# Patient Record
Sex: FEMALE | Race: WHITE | Employment: UNEMPLOYED | URBAN - METROPOLITAN AREA
[De-identification: names, ages, dates, MRNs, and addresses within clinical notes are randomized per-mention and may not be internally consistent; named-entity substitution may affect disease eponyms.]

---

## 2018-05-04 ENCOUNTER — OFFICE VISIT (OUTPATIENT)
Dept: FAMILY MEDICINE CLINIC | Facility: CLINIC | Age: 38
End: 2018-05-04

## 2018-05-04 ENCOUNTER — LAB ENCOUNTER (OUTPATIENT)
Dept: LAB | Age: 38
End: 2018-05-04
Attending: FAMILY MEDICINE
Payer: MEDICAID

## 2018-05-04 VITALS
HEART RATE: 79 BPM | HEIGHT: 63 IN | SYSTOLIC BLOOD PRESSURE: 98 MMHG | OXYGEN SATURATION: 99 % | TEMPERATURE: 98 F | BODY MASS INDEX: 17.89 KG/M2 | WEIGHT: 101 LBS | DIASTOLIC BLOOD PRESSURE: 60 MMHG

## 2018-05-04 DIAGNOSIS — Z00.00 ROUTINE MEDICAL EXAM: Primary | ICD-10-CM

## 2018-05-04 DIAGNOSIS — E55.9 VITAMIN D DEFICIENCY: ICD-10-CM

## 2018-05-04 DIAGNOSIS — D89.89 AUTOIMMUNE DISORDER (HCC): ICD-10-CM

## 2018-05-04 DIAGNOSIS — E53.8 B12 DEFICIENCY: ICD-10-CM

## 2018-05-04 DIAGNOSIS — D50.8 OTHER IRON DEFICIENCY ANEMIA: ICD-10-CM

## 2018-05-04 DIAGNOSIS — G62.9 NEUROPATHY: ICD-10-CM

## 2018-05-04 DIAGNOSIS — M41.25 OTHER IDIOPATHIC SCOLIOSIS, THORACOLUMBAR REGION: ICD-10-CM

## 2018-05-04 DIAGNOSIS — Z00.00 ROUTINE MEDICAL EXAM: ICD-10-CM

## 2018-05-04 DIAGNOSIS — D72.818 OTHER DECREASED WHITE BLOOD CELL (WBC) COUNT: ICD-10-CM

## 2018-05-04 DIAGNOSIS — Z78.9 VEGAN: ICD-10-CM

## 2018-05-04 DIAGNOSIS — R17 TOTAL BILIRUBIN, ELEVATED: ICD-10-CM

## 2018-05-04 PROCEDURE — 99385 PREV VISIT NEW AGE 18-39: CPT | Performed by: FAMILY MEDICINE

## 2018-05-04 PROCEDURE — 82306 VITAMIN D 25 HYDROXY: CPT

## 2018-05-04 PROCEDURE — 84466 ASSAY OF TRANSFERRIN: CPT

## 2018-05-04 PROCEDURE — 80061 LIPID PANEL: CPT

## 2018-05-04 PROCEDURE — 83540 ASSAY OF IRON: CPT

## 2018-05-04 PROCEDURE — 80053 COMPREHEN METABOLIC PANEL: CPT

## 2018-05-04 PROCEDURE — 82728 ASSAY OF FERRITIN: CPT

## 2018-05-04 PROCEDURE — 84443 ASSAY THYROID STIM HORMONE: CPT

## 2018-05-04 PROCEDURE — 82746 ASSAY OF FOLIC ACID SERUM: CPT

## 2018-05-04 PROCEDURE — 36415 COLL VENOUS BLD VENIPUNCTURE: CPT

## 2018-05-04 PROCEDURE — 82607 VITAMIN B-12: CPT

## 2018-05-04 PROCEDURE — 85025 COMPLETE CBC W/AUTO DIFF WBC: CPT

## 2018-05-04 NOTE — H&P
HPI:   Patient presents with:  Establish Care  Physical  Numbness  Musculoskeletal Problem      Carolina Booker is a 41year old female who presents for a complete physical exam without pap/gyne exam.     Patient has new complaints of:  · ***    She denie Years of education:                 Number of children:               Social History Main Topics    Smoking status: Never Smoker                                                                Smokeless tobacco: Never Used back  PSYCH: mood and affect WNL, speech and thought congruent  NEURO: A&O x 3, CN II-XII intact, motor and sensory grossly intact B UE and LE, no tremor B UE, DTR's 2/4 B LE, gait WNL     ASSESSMENT AND PLAN:   1.  [place] is a 41year old female wh Future    6. Other iron deficiency anemia  ***  - CBC WITH DIFFERENTIAL WITH PLATELET; Future  - IRON AND TIBC; Future  - FERRITIN; Future    7. B12 deficiency  ***  - CBC WITH DIFFERENTIAL WITH PLATELET;  Future  - VITAMIN B12; Future      The patient valerie

## 2018-05-05 NOTE — H&P
HPI:   Patient presents with:  Establish Care  Physical: vegan, nutrition deficiencies  Numbness: B feet, h/o U UE/LE, r/o MS  Musculoskeletal Problem: B feet-pressure/neuropathy      Areli Corrales is a 41year old female who presents for a complete phy Alvaro's testing was negative in 2010. • Copper deficiency 5/29/2015    Alvaro's testing was negative in 2010.    • Demyelinating lesion ([address]    • Depression       Past Surgical History:  dec 1998: OTHER SURGICAL HISTORY Right      Comment: Authroscopic, Encounters:  05/04/18 : 101 lb  05/29/15 : 97 lb 9.6 oz    GENERAL: well developed, well nourished, in no apparent distress  SKIN: no rashes, no suspicious lesions, color wnl  HEENT: atraumatic, normocephalic, nose and throat are clear; dentition good, EAR Future  - VITAMIN B12; Future  - VITAMIN D, 25-HYDROXY; Future  - IRON AND TIBC; Future  - FOLIC ACID SERUM(FOLATE); Future    2.  Neuropathy  ***  - ASSAY, THYROID STIM HORMONE; Future  - VITAMIN B12; Future  - VITAMIN D, 99-DYFDYAM; Future  - FOLIC ACID S

## 2018-05-06 PROBLEM — Z78.9 VEGAN: Status: ACTIVE | Noted: 2018-05-06

## 2018-05-06 PROBLEM — M41.25 OTHER IDIOPATHIC SCOLIOSIS, THORACOLUMBAR REGION: Status: ACTIVE | Noted: 2018-05-06

## 2018-05-06 PROBLEM — D72.819 LEUCOPENIA: Status: ACTIVE | Noted: 2018-05-06

## 2018-05-06 NOTE — H&P
HPI:   Patient presents with:  Establish Care  Physical: vegan, nutrition deficiencies  Numbness: B feet, h/o U UE/LE, r/o MS  Musculoskeletal Problem: B feet-pressure/neuropathy      Binu Fallon is a 41year old female who presents for a complete phy below for other pertinent positive and negative complaints    I reviewed her's Past Medical History, Past Surgical History, Family and Social History     Labs:     Lab Results  Component Value Date/Time   WBC 3.4 (L) 05/04/2018 12:02 PM   HGB 12.8 05/04/20 [OTHER] Paternal Grandmother    • Heart Disorder Paternal Grandfather    • Other[other] [OTHER] Sister      No Known Allergies   Social History:  Social History    Marital status: Single              Spouse name:                       Years of education: HSM or tenderness  EXTREMITIES: no CCE,  brachial, DP, PT pulses wnl B  MS: no significant joint or bony deformities B UE and LE, except scoliosis of back-mild, FROM of B UE and LE and back  PSYCH: mood and affect WNL, speech and thought congruent  NEURO: SERUM(FOLATE); Future  - NEURO - INTERNAL  - RHEUMATOLOGY - INTERNAL    3. Autoimmune disorder (HCC)  Nonspecific, check labs as below, see  - CBC WITH DIFFERENTIAL WITH PLATELET;  Future  - ASSAY, THYROID STIM HORMONE; Future  - NEURO - INTERNAL  - RHEUMAT

## 2018-05-09 PROBLEM — E61.1 IRON DEFICIENCY: Status: ACTIVE | Noted: 2018-05-09

## 2018-05-09 PROBLEM — D72.819 LEUKOCYTOPENIA: Status: ACTIVE | Noted: 2018-05-06

## 2018-07-08 ENCOUNTER — PATIENT MESSAGE (OUTPATIENT)
Dept: FAMILY MEDICINE CLINIC | Facility: CLINIC | Age: 38
End: 2018-07-08

## 2018-07-08 DIAGNOSIS — D89.89 AUTOIMMUNE DISORDER (HCC): Primary | ICD-10-CM

## 2018-08-17 ENCOUNTER — TELEPHONE (OUTPATIENT)
Dept: FAMILY MEDICINE CLINIC | Facility: CLINIC | Age: 38
End: 2018-08-17

## 2018-08-31 ENCOUNTER — LAB ENCOUNTER (OUTPATIENT)
Dept: LAB | Facility: REFERENCE LAB | Age: 38
End: 2018-08-31
Attending: FAMILY MEDICINE
Payer: MEDICAID

## 2018-08-31 DIAGNOSIS — D72.818 OTHER DECREASED WHITE BLOOD CELL (WBC) COUNT: ICD-10-CM

## 2018-08-31 DIAGNOSIS — E61.1 IRON DEFICIENCY: ICD-10-CM

## 2018-08-31 LAB
BASOPHILS # BLD: 0 K/UL (ref 0–0.2)
BASOPHILS NFR BLD: 1 %
EOSINOPHIL # BLD: 0.1 K/UL (ref 0–0.7)
EOSINOPHIL NFR BLD: 2 %
ERYTHROCYTE [DISTWIDTH] IN BLOOD BY AUTOMATED COUNT: 13.2 % (ref 11–15)
FERRITIN SERPL IA-MCNC: 16 NG/ML (ref 11–307)
HCT VFR BLD AUTO: 39.4 % (ref 35–48)
HGB BLD-MCNC: 13.3 G/DL (ref 12–16)
IRON SATN MFR SERPL: 19 % (ref 15–50)
IRON SERPL-MCNC: 68 MCG/DL (ref 28–170)
LYMPHOCYTES # BLD: 1.1 K/UL (ref 1–4)
LYMPHOCYTES NFR BLD: 28 %
MCH RBC QN AUTO: 30.6 PG (ref 27–32)
MCHC RBC AUTO-ENTMCNC: 33.7 G/DL (ref 32–37)
MCV RBC AUTO: 91 FL (ref 80–100)
MONOCYTES # BLD: 0.2 K/UL (ref 0–1)
MONOCYTES NFR BLD: 6 %
NEUTROPHILS # BLD AUTO: 2.5 K/UL (ref 1.8–7.7)
NEUTROPHILS NFR BLD: 64 %
PLATELET # BLD AUTO: 198 K/UL (ref 140–400)
PMV BLD AUTO: 9.5 FL (ref 7.4–10.3)
RBC # BLD AUTO: 4.34 M/UL (ref 3.7–5.4)
TIBC SERPL-MCNC: 364 MCG/DL (ref 228–428)
TRANSFERRIN SERPL-MCNC: 276 MG/DL (ref 192–382)
WBC # BLD AUTO: 3.9 K/UL (ref 4–11)

## 2018-08-31 PROCEDURE — 85025 COMPLETE CBC W/AUTO DIFF WBC: CPT

## 2018-08-31 PROCEDURE — 84466 ASSAY OF TRANSFERRIN: CPT

## 2018-08-31 PROCEDURE — 82728 ASSAY OF FERRITIN: CPT

## 2018-08-31 PROCEDURE — 83540 ASSAY OF IRON: CPT

## 2018-08-31 PROCEDURE — 36415 COLL VENOUS BLD VENIPUNCTURE: CPT

## 2019-02-13 ENCOUNTER — TELEPHONE (OUTPATIENT)
Dept: FAMILY MEDICINE CLINIC | Facility: CLINIC | Age: 39
End: 2019-02-13

## 2019-02-13 NOTE — TELEPHONE ENCOUNTER
Stomach pain spreading to her back,seems worse in afternoon and evening. Offered her today at 5 she can't do it. Would like a cb from nurse.

## 2019-02-14 NOTE — TELEPHONE ENCOUNTER
LMOVM instructing pt to return call to schedule appt and to seek medical care through the nearest ER/IC if pain persists or worsens.

## 2019-02-14 NOTE — TELEPHONE ENCOUNTER
Pt returned call, states she has abdominal pain on and off in the RUQ and LUQ. Pt denies N/V, fever. Hx of loose stools. Attempted use of OTC medications (Mylanta, Tums), with little help. Pain has transitioned to a \"heartburn\" feeling.  States the LUQ pa

## 2019-02-19 ENCOUNTER — OFFICE VISIT (OUTPATIENT)
Dept: FAMILY MEDICINE CLINIC | Facility: CLINIC | Age: 39
End: 2019-02-19
Payer: MEDICAID

## 2019-02-19 ENCOUNTER — HOSPITAL ENCOUNTER (OUTPATIENT)
Dept: GENERAL RADIOLOGY | Age: 39
Discharge: HOME | End: 2019-02-19
Attending: FAMILY MEDICINE
Payer: MEDICAID

## 2019-02-19 VITALS
HEART RATE: 81 BPM | HEIGHT: 63 IN | SYSTOLIC BLOOD PRESSURE: 120 MMHG | WEIGHT: 103 LBS | RESPIRATION RATE: 16 BRPM | BODY MASS INDEX: 18.25 KG/M2 | OXYGEN SATURATION: 98 % | DIASTOLIC BLOOD PRESSURE: 60 MMHG

## 2019-02-19 DIAGNOSIS — K21.9 GERD WITHOUT ESOPHAGITIS: ICD-10-CM

## 2019-02-19 DIAGNOSIS — R11.0 NAUSEA: ICD-10-CM

## 2019-02-19 DIAGNOSIS — R10.84 GENERALIZED ABDOMINAL PAIN: ICD-10-CM

## 2019-02-19 DIAGNOSIS — R10.84 GENERALIZED ABDOMINAL PAIN: Primary | ICD-10-CM

## 2019-02-19 DIAGNOSIS — R10.9 ACUTE RIGHT FLANK PAIN: ICD-10-CM

## 2019-02-19 LAB
APPEARANCE: CLEAR
BILIRUBIN: NEGATIVE
CONTROL LINE PRESENT WITH A CLEAR BACKGROUND (YES/NO): YES YES/NO
GLUCOSE (URINE DIPSTICK): NEGATIVE MG/DL
KETONES (URINE DIPSTICK): NEGATIVE MG/DL
KIT LOT #: NORMAL NUMERIC
LEUKOCYTES: NEGATIVE
MULTISTIX LOT#: NORMAL NUMERIC
NITRITE, URINE: NEGATIVE
PH, URINE: 7 (ref 4.5–8)
PREGNANCY TEST, URINE: NEGATIVE
PROTEIN (URINE DIPSTICK): NEGATIVE MG/DL
SPECIFIC GRAVITY: 1 (ref 1–1.03)
URINE-COLOR: YELLOW
UROBILINOGEN,SEMI-QN: 0.2 MG/DL (ref 0–1.9)

## 2019-02-19 PROCEDURE — 81003 URINALYSIS AUTO W/O SCOPE: CPT | Performed by: FAMILY MEDICINE

## 2019-02-19 PROCEDURE — 99214 OFFICE O/P EST MOD 30 MIN: CPT | Performed by: FAMILY MEDICINE

## 2019-02-19 PROCEDURE — 81025 URINE PREGNANCY TEST: CPT | Performed by: FAMILY MEDICINE

## 2019-02-19 PROCEDURE — 74019 RADEX ABDOMEN 2 VIEWS: CPT | Performed by: FAMILY MEDICINE

## 2019-02-19 RX ORDER — OMEPRAZOLE 20 MG/1
20 CAPSULE, DELAYED RELEASE ORAL DAILY PRN
Qty: 30 CAPSULE | Refills: 1 | Status: SHIPPED | OUTPATIENT
Start: 2019-02-19 | End: 2019-05-08

## 2019-02-19 NOTE — PROGRESS NOTES
HPI:   Patient presents with:  Abdominal Pain: started 11/18 on L side before lunch and dinner, eating helped the pain. 12/18 pain started on R side, L resolved.  2/2019 pain started in back and abdominal. Otc tums and DGL no improvement  Back Pain  Tsosie Channel daily.   Disp:  Rfl:       Past Medical History:   Diagnosis Date   • Copper deficiency 5/29/2015    Alvaro's testing was negative in 2010. • Copper deficiency 5/29/2015    Alvaro's testing was negative in 2010.    • Demyelinating lesion ([facility] [address]    • Susan no HSM; tenderness: Mild right upper quadrant, no rebound, no guarding, no CVA tenderness B  NEURO: A&O x 3, motor and sensory grossly intact B UE and LE, gait normal  MS: no joint deformity, FROM B UE/LE  PSYCH:  mood and affect WNL    ASSESSMENT AND PLAN SCOPE  - US ABDOMEN COMPLETE (CPT=76700); Future      The patient indicates understanding of the above recommendations and agrees to the above plan.   Follow up: as above    Orders Placed This Encounter      POC Urinalysis, Automated Dip without microscopy

## 2019-02-19 NOTE — PATIENT INSTRUCTIONS
COLON CLEANSE:    Recommend starting with 1-2 Fleets enemas (or generic equivalent) as per package instructions. Start with one enema, if no significant bowel movement within 3-4 hours, repeat enema.     If bowel movement occurs then drink 1/2-1 bottle of

## 2019-02-20 ENCOUNTER — TELEPHONE (OUTPATIENT)
Dept: FAMILY MEDICINE CLINIC | Facility: CLINIC | Age: 39
End: 2019-02-20

## 2019-02-20 NOTE — TELEPHONE ENCOUNTER
Spoke to pt, she is concerned with the use of the colon cleanse (minus the enemas). She states she is worried the Miralax will cause her to have increased diarrhea.  Pt reassured and informed Miralax is a gentle laxative and will assist in relieving the gas

## 2019-02-20 NOTE — TELEPHONE ENCOUNTER
Pt has a question regarding abdomen xray results.  Plz call back to [facility]

## 2019-02-28 ENCOUNTER — TELEPHONE (OUTPATIENT)
Dept: FAMILY MEDICINE CLINIC | Facility: CLINIC | Age: 39
End: 2019-02-28

## 2019-02-28 NOTE — TELEPHONE ENCOUNTER
Update of sxs: Pt states she followed the colon cleanse but attempted the use of Magnesium Citrate, vomited it up. States her diarrhea has improved; stools are less loose but not completely formed.  Abdominal pain has improved but states she continues to fe

## 2019-03-01 ENCOUNTER — OFFICE VISIT (OUTPATIENT)
Dept: FAMILY MEDICINE CLINIC | Facility: CLINIC | Age: 39
End: 2019-03-01
Payer: MEDICAID

## 2019-03-01 ENCOUNTER — TELEPHONE (OUTPATIENT)
Dept: FAMILY MEDICINE CLINIC | Facility: CLINIC | Age: 39
End: 2019-03-01

## 2019-03-01 VITALS
SYSTOLIC BLOOD PRESSURE: 110 MMHG | DIASTOLIC BLOOD PRESSURE: 60 MMHG | WEIGHT: 103 LBS | HEART RATE: 80 BPM | BODY MASS INDEX: 18.25 KG/M2 | TEMPERATURE: 98 F | RESPIRATION RATE: 16 BRPM | HEIGHT: 63 IN

## 2019-03-01 DIAGNOSIS — R22.0 TONGUE SWELLING: ICD-10-CM

## 2019-03-01 DIAGNOSIS — R19.7 DIARRHEA, UNSPECIFIED TYPE: ICD-10-CM

## 2019-03-01 DIAGNOSIS — K14.0 TRANSIENT LINGUAL PAPILLITIS: Primary | ICD-10-CM

## 2019-03-01 DIAGNOSIS — R10.13 EPIGASTRIC ABDOMINAL PAIN: ICD-10-CM

## 2019-03-01 DIAGNOSIS — F41.9 ANXIETY: ICD-10-CM

## 2019-03-01 DIAGNOSIS — R43.2 LOSS OF TASTE: ICD-10-CM

## 2019-03-01 DIAGNOSIS — R14.0 BLOATING: ICD-10-CM

## 2019-03-01 PROCEDURE — 99214 OFFICE O/P EST MOD 30 MIN: CPT | Performed by: FAMILY MEDICINE

## 2019-03-01 RX ORDER — TRIAMCINOLONE ACETONIDE 0.1 %
1 PASTE (GRAM) DENTAL SEE ADMIN INSTRUCTIONS
Qty: 5 G | Refills: 3 | Status: SHIPPED | OUTPATIENT
Start: 2019-03-01 | End: 2019-05-08

## 2019-03-01 NOTE — PROGRESS NOTES
HPI: 41year old female presents c/o Patient presents with:  Burning Tongue: c/o swelling x2wks    VSS.  Describes using a Licorice Powder on her tongue 2 weeks ago for GERD and started having tongue swelling, numbness, loss of taste-s/s have improved since weight changes, rashes, lesions. Further review is otherwise negative.      03/01/19  1001   BP: 110/60   Pulse: 80   Resp: 16   Temp: 98 °F (36.7 °C)   Weight: 103 lb   Height: 63\"       Wt Readings from Last 6 Encounters:  03/01/19 : 103 lb  02/19/19 : 1 [facility] in the past without improvement in symptoms. She describes exacerbation of symptoms with certain foods such as wheat even though screening tests such as celiac have been negative-referred to Allergy/Immunology.  Patient resistant to tri

## 2019-03-01 NOTE — TELEPHONE ENCOUNTER
Spoke to [employer] to begin PA for pt's US Abdomen Complete. Clinical notes faxed to [phone] as requested. Case number [id number]; will await answer.

## 2019-03-01 NOTE — TELEPHONE ENCOUNTER
Call pt-get abd US as recommended.  She may be able to do on 3/1/19 when in office to see Dr. Roderick Zaldivar

## 2019-03-01 NOTE — TELEPHONE ENCOUNTER
Pt was in for appt today with Dr. Isma Sesay. Informed her of recommendation to complete US Abdomen per Dr. Thierno Mcnulty recommendations. See message regarding Prior Auth for imaging.

## 2019-03-01 NOTE — PATIENT INSTRUCTIONS
PLAN:  -Triamcinolone Rx Paste prescribed  -stick with room temperature bland food/liquids-water  -avoid spicy/sour/acidic foods or hot liquids  -good oral hygeine    -encouraged trial with Omeprazole  -abd US ordered (awaiting authorization)  -to consider

## 2019-03-04 NOTE — TELEPHONE ENCOUNTER
Rcvd fax from [facility] stating pt has been approved for the US Abdomen ([id number]) from 3/1/2019 to 4/15/2019. To be completed at [address] [id number]  Further questions, call [phone]    OV to return call.

## 2019-03-15 ENCOUNTER — TELEPHONE (OUTPATIENT)
Dept: FAMILY MEDICINE CLINIC | Facility: CLINIC | Age: 39
End: 2019-03-15

## 2019-03-15 NOTE — TELEPHONE ENCOUNTER
Pt is not all better but the cream she put on her tounge has helped but she is not all better like Dr. Sabina Yañez said she should be. She would like a cb from nurse.

## 2019-03-18 NOTE — TELEPHONE ENCOUNTER
Spoke to pt, she states she completed the steroid pack and her sxs returned to what they were prior to the medication. Soreness in mouth and taste buds irritated. Notices white patches in throat and on tongue and a red spot on buccal mucossa. Pt denies biting or injuring the buccal area. Offered appt with Dr. Kj Live for 3/19/19; pt refused stating she has an US appt at 10 am and does \"not want to wait around\". Pt informed message would be sent to provider. Per LOV with Dr. Leesa Sunshine:    IMPRESSION:  1. Tongue Swelling/Loss of Taste/Numbness - improving; suspect 2/t transient lingual papillitis. F/u in 2 weeks. If s/s persist without improvement consider lab w/u vs ENT evaluation.

## 2019-03-19 ENCOUNTER — TELEPHONE (OUTPATIENT)
Dept: FAMILY MEDICINE CLINIC | Facility: CLINIC | Age: 39
End: 2019-03-19

## 2019-03-19 ENCOUNTER — HOSPITAL ENCOUNTER (OUTPATIENT)
Dept: ULTRASOUND IMAGING | Age: 39
Discharge: HOME | End: 2019-03-19
Attending: FAMILY MEDICINE
Payer: MEDICAID

## 2019-03-19 DIAGNOSIS — R11.0 NAUSEA: ICD-10-CM

## 2019-03-19 DIAGNOSIS — R10.9 ACUTE RIGHT FLANK PAIN: ICD-10-CM

## 2019-03-19 DIAGNOSIS — R10.84 GENERALIZED ABDOMINAL PAIN: ICD-10-CM

## 2019-03-19 PROCEDURE — 76700 US EXAM ABDOM COMPLETE: CPT | Performed by: FAMILY MEDICINE

## 2019-03-19 NOTE — TELEPHONE ENCOUNTER
Jessica Martiniritchie walked in and wanted her lab results for the last year printed out for her doctor appt this afternoon at [facility] [place]. I told her nurse Digna Kinsey could print for her but she would have to wait. She didn't want to wait. She will check with doc office later.

## 2019-03-19 NOTE — TELEPHONE ENCOUNTER
Spoke to pt, informed her per Dr. Abe Solis, to f/u in the office for a re-eval. Pt states she is unable to make it in for an appt at this time and will call to schedule.

## 2019-03-20 NOTE — TELEPHONE ENCOUNTER
KD to return call regarding referral to ENT. Pt needs to confirm Dr. Yamil Senior is in her network. Referral to completed once pt responds. Inform pt she has the option of being evaluated by Dr. Daniel Mills for the white patches rather than waiting for ENT.

## 2019-03-20 NOTE — TELEPHONE ENCOUNTER
Rec ENT-Dr. Abbie steele given persistent symptoms. Regarding new white patches-rec OV to evaluate-ENT will evaluate this as well however it may be something easily treatable from a primary standpoint while she is waiting for ENT evaluation.

## 2019-03-21 NOTE — TELEPHONE ENCOUNTER
Spoke to pt, informed her to contact her insurance to obtain name of ENT provider covered by her insurance. Pt to call back once completed. Pt asking to have abdominal US report faxed to Dr. Stanislav Baker at [phone]. Report placed at the  for faxing.

## 2019-05-01 ENCOUNTER — TELEPHONE (OUTPATIENT)
Dept: FAMILY MEDICINE CLINIC | Facility: CLINIC | Age: 39
End: 2019-05-01

## 2019-05-01 NOTE — TELEPHONE ENCOUNTER
Informed pt that pap should be done every 3 yrs and if she  wants to get them done every 5 yrs she would need to speak with the DrManuel At her next pap visit. Pt verbalized understanding.

## 2019-05-09 ENCOUNTER — OFFICE VISIT (OUTPATIENT)
Dept: FAMILY MEDICINE CLINIC | Facility: CLINIC | Age: 39
End: 2019-05-09
Payer: MEDICAID

## 2019-05-09 ENCOUNTER — LAB ENCOUNTER (OUTPATIENT)
Dept: LAB | Facility: REFERENCE LAB | Age: 39
End: 2019-05-09
Attending: FAMILY MEDICINE
Payer: MEDICAID

## 2019-05-09 VITALS
SYSTOLIC BLOOD PRESSURE: 100 MMHG | DIASTOLIC BLOOD PRESSURE: 72 MMHG | BODY MASS INDEX: 17.63 KG/M2 | WEIGHT: 97 LBS | TEMPERATURE: 98 F | HEIGHT: 62.3 IN | OXYGEN SATURATION: 98 % | HEART RATE: 76 BPM | RESPIRATION RATE: 17 BRPM

## 2019-05-09 DIAGNOSIS — R10.9 ABDOMINAL PAIN, UNSPECIFIED ABDOMINAL LOCATION: ICD-10-CM

## 2019-05-09 DIAGNOSIS — Z00.00 ROUTINE GENERAL MEDICAL EXAMINATION AT A HEALTH CARE FACILITY: Primary | ICD-10-CM

## 2019-05-09 DIAGNOSIS — R43.2 LOSS OF TASTE: ICD-10-CM

## 2019-05-09 DIAGNOSIS — Z78.9 VEGAN DIET: ICD-10-CM

## 2019-05-09 DIAGNOSIS — R22.0 TONGUE SWELLING: ICD-10-CM

## 2019-05-09 DIAGNOSIS — Z00.00 ROUTINE GENERAL MEDICAL EXAMINATION AT A HEALTH CARE FACILITY: ICD-10-CM

## 2019-05-09 DIAGNOSIS — N63.0 BREAST MASS IN FEMALE: ICD-10-CM

## 2019-05-09 DIAGNOSIS — E61.1 IRON DEFICIENCY: ICD-10-CM

## 2019-05-09 DIAGNOSIS — D18.00 HEMANGIOMA, UNSPECIFIED SITE: ICD-10-CM

## 2019-05-09 DIAGNOSIS — R63.6 UNDERWEIGHT: ICD-10-CM

## 2019-05-09 PROCEDURE — 85025 COMPLETE CBC W/AUTO DIFF WBC: CPT

## 2019-05-09 PROCEDURE — 82728 ASSAY OF FERRITIN: CPT

## 2019-05-09 PROCEDURE — 82306 VITAMIN D 25 HYDROXY: CPT

## 2019-05-09 PROCEDURE — 86225 DNA ANTIBODY NATIVE: CPT | Performed by: FAMILY MEDICINE

## 2019-05-09 PROCEDURE — 86039 ANTINUCLEAR ANTIBODIES (ANA): CPT | Performed by: FAMILY MEDICINE

## 2019-05-09 PROCEDURE — 86235 NUCLEAR ANTIGEN ANTIBODY: CPT | Performed by: FAMILY MEDICINE

## 2019-05-09 PROCEDURE — 82607 VITAMIN B-12: CPT

## 2019-05-09 PROCEDURE — 99395 PREV VISIT EST AGE 18-39: CPT | Performed by: FAMILY MEDICINE

## 2019-05-09 PROCEDURE — 86038 ANTINUCLEAR ANTIBODIES: CPT | Performed by: FAMILY MEDICINE

## 2019-05-09 PROCEDURE — 80053 COMPREHEN METABOLIC PANEL: CPT

## 2019-05-09 PROCEDURE — 36415 COLL VENOUS BLD VENIPUNCTURE: CPT

## 2019-05-09 PROCEDURE — 80061 LIPID PANEL: CPT

## 2019-05-09 PROCEDURE — 99213 OFFICE O/P EST LOW 20 MIN: CPT | Performed by: FAMILY MEDICINE

## 2019-05-09 PROCEDURE — 84443 ASSAY THYROID STIM HORMONE: CPT

## 2019-05-09 NOTE — PATIENT INSTRUCTIONS
PLAN:  -fasting bloodwork  -breast US, possible mammogram  -liver MRI ordered  -f/u with oral surgery  -recommend ENT-Dr. Latonia Junior. Trial with Prilosec.  Will consider GI if needed in the future for EGD  -f/u annually/as needed

## 2019-05-09 NOTE — PROGRESS NOTES
HPI: 42year old female presents for a general physical. VSS. Denies fevers/chills, C.P., palpitations, dizziness, SOB, cough, abd pain, N/V/D, fatigue. No recent illness. Nml urine without dysuria or hematuria. Nml BM's.  Weight going down-patient says it swelling, taste sensation changes, abd pain, hemangioma, weight dec.  Patient has otherwise been well without fevers/chills, C.P., SOB, cough/wheezing, runny nose or frequent sneezing/itchy eyes, heartburn, polyuria, polydipsia, heat or cold intolerance, fa possible diag mammogram ordered  3. Abdominal Pain - exam unremarkable. Abd US with hemangioma. Abd Xray unremarkable. UA nml. HcG neg. Check for H. Pylori. Consider GI for EGD. Musculoskeletal?  4.  Hemangioma - patient is very anxious about this-I have re

## 2019-05-10 ENCOUNTER — TELEPHONE (OUTPATIENT)
Dept: FAMILY MEDICINE CLINIC | Facility: CLINIC | Age: 39
End: 2019-05-10

## 2019-05-10 DIAGNOSIS — N63.0 BREAST MASS: Primary | ICD-10-CM

## 2019-05-10 NOTE — TELEPHONE ENCOUNTER
Patient called states she attempted to schedule her US of R breast w Axilla with [facility], hospital will not allow pt to schedule this, states she must get Bilat Diagnostic Mammogram W/ US if needed prior to [address]. Pended.     MRI of Liver prior auth

## 2019-05-13 ENCOUNTER — TELEPHONE (OUTPATIENT)
Dept: FAMILY MEDICINE CLINIC | Facility: CLINIC | Age: 39
End: 2019-05-13

## 2019-05-13 NOTE — TELEPHONE ENCOUNTER
----- Message from Marbella Michelle DO sent at 5/13/2019 12:56 PM CDT -----  Nml CMP/TSH/iron stores (ferritin)/vit D    Lipids: elev LDL chol, low HDL chol-both mild.  Rec HHD/DASH diet for LDL chol lowering; regular exercise: goal 5x/week of moderate-in

## 2019-05-13 NOTE — TELEPHONE ENCOUNTER
Spoke with pt, advised normal labs except lipids recommended DASH diet and recheck in 1 year. B12 high, patient states she is currently taking vitamin B 12 500 mcg daily, but did stop taking 5 days prior to draw.  Patient is also taking Vitamin D 4000U anju

## 2019-05-13 NOTE — TELEPHONE ENCOUNTER
PA completed for MRI of abd. Case # [id number]  Faxed clinicals (last office note) to [phone].  2 day turnaround.

## 2019-05-14 NOTE — TELEPHONE ENCOUNTER
Ferritin is at adequate levels-pls reassure. Half current vit B12 dose. Continue current vitamin D dose.     -->Await LACHELLE for further recs

## 2019-05-14 NOTE — TELEPHONE ENCOUNTER
Spoke with patient, advised to take half of current dose of Vitamin B 12 & cont current dose of Vitamin D.  Reassured pt that Ferritin levels are normal, if shed like to repeat in month to let us know, but no guaranteed of coverage.   Patient verbalizes und

## 2019-05-17 ENCOUNTER — TELEPHONE (OUTPATIENT)
Dept: FAMILY MEDICINE CLINIC | Facility: CLINIC | Age: 39
End: 2019-05-17

## 2019-05-17 DIAGNOSIS — R74.8 ELEVATED VITAMIN B12 LEVEL: Primary | ICD-10-CM

## 2019-05-17 NOTE — TELEPHONE ENCOUNTER
Spoketo pt, informed her of LACHELLE result. Pt verbalizes understanding and would like to know if she should have the Vit B12 level checked when she rechecks the LACHELLE and CBC in 1 month.  See TE dated 5/13/19; pt states she is holding her Vit B12 dose at this ti

## 2019-05-17 NOTE — TELEPHONE ENCOUNTER
Pt called and relayed to RN that she definitely needs prior auth for mammogram that is scheduled for 5/24/2019. MA is aware that this needs to be completed and will follow up to obtain prior auth.

## 2019-05-17 NOTE — TELEPHONE ENCOUNTER
PA approved for MRI of abd 5/15/2019-6/29/2019.   Request ID [id number]    Called pt, she is aware of approval.

## 2019-05-17 NOTE — TELEPHONE ENCOUNTER
----- Message from Velma Lawson DO sent at 5/16/2019  4:43 PM CDT -----  LACHELLE borderline positive. Rec repeating LACHELLE/CBC in 1 month.

## 2019-05-17 NOTE — TELEPHONE ENCOUNTER
Rcvd fax from [facility] stating they are not able to read the fax sent to them on 5/10/19. Please refax.

## 2019-05-20 NOTE — TELEPHONE ENCOUNTER
Pt cld needs prior auth for mammo that is Friday. Told her I would forward to [facility] as high priority.

## 2019-05-21 NOTE — TELEPHONE ENCOUNTER
Pt cb she just talked to her insurance. They say a prior auth is needed for an ultrasound with mammo. They said to go ahead and do the ultrasound with mammo on Friday if the prior auth isn't done we will have to absorb the cost.Told her I would send to [facility]

## 2019-05-23 ENCOUNTER — TELEPHONE (OUTPATIENT)
Dept: FAMILY MEDICINE CLINIC | Facility: CLINIC | Age: 39
End: 2019-05-23

## 2019-05-23 NOTE — TELEPHONE ENCOUNTER
RN spoke with patient regarding the mammogram will not need prior auth if done at one of [place] facilities. Patient verbalizes understanding and agrees with plan.

## 2019-05-24 ENCOUNTER — HOSPITAL ENCOUNTER (OUTPATIENT)
Dept: MAMMOGRAPHY | Facility: HOSPITAL | Age: 39
Discharge: HOME | End: 2019-05-24
Attending: FAMILY MEDICINE
Payer: MEDICAID

## 2019-05-24 ENCOUNTER — HOSPITAL ENCOUNTER (OUTPATIENT)
Dept: ULTRASOUND IMAGING | Facility: HOSPITAL | Age: 39
Discharge: HOME | End: 2019-05-24
Attending: FAMILY MEDICINE
Payer: MEDICAID

## 2019-05-24 ENCOUNTER — HOSPITAL ENCOUNTER (OUTPATIENT)
Dept: MRI IMAGING | Facility: HOSPITAL | Age: 39
Discharge: HOME | End: 2019-05-24
Attending: FAMILY MEDICINE
Payer: MEDICAID

## 2019-05-24 DIAGNOSIS — N63.0 BREAST MASS: ICD-10-CM

## 2019-05-24 DIAGNOSIS — D18.00 HEMANGIOMA, UNSPECIFIED SITE: ICD-10-CM

## 2019-05-24 PROCEDURE — 77062 BREAST TOMOSYNTHESIS BI: CPT | Performed by: FAMILY MEDICINE

## 2019-05-24 PROCEDURE — 74183 MRI ABD W/O CNTR FLWD CNTR: CPT | Performed by: FAMILY MEDICINE

## 2019-05-24 PROCEDURE — A9575 INJ GADOTERATE MEGLUMI 0.1ML: HCPCS | Performed by: FAMILY MEDICINE

## 2019-05-24 PROCEDURE — 76642 ULTRASOUND BREAST LIMITED: CPT | Performed by: FAMILY MEDICINE

## 2019-05-24 PROCEDURE — 77066 DX MAMMO INCL CAD BI: CPT | Performed by: FAMILY MEDICINE

## 2019-05-29 ENCOUNTER — TELEPHONE (OUTPATIENT)
Dept: FAMILY MEDICINE CLINIC | Facility: CLINIC | Age: 39
End: 2019-05-29

## 2019-05-29 NOTE — TELEPHONE ENCOUNTER
Does she have her MRI from 3 yrs ago? Same recs as below-a bit unsure what she is looking for-can she come in to discuss and we can re-evaluate her symptoms? Alternatively-GI consultation to discuss hemangiomas in more detail is an option.

## 2019-05-29 NOTE — TELEPHONE ENCOUNTER
Spoke to pt, she now states she had a US Liver 3 years ago and not a MRI Liver. She states she brought in the report for her LOV but was returned to her and does not believe a copy was taken.  Pt feels she has seen GI in the past for the hemangiomas but was

## 2019-05-29 NOTE — TELEPHONE ENCOUNTER
----- Message from Rex Motta DO sent at 5/27/2019 11:12 PM CDT -----  MRI liver: confirms hemangiomas-they are all <5cm which is typically benign and do not require follow-up unless persistent symptoms in this area (not assoc with alternative diag

## 2019-05-29 NOTE — TELEPHONE ENCOUNTER
Spoke to pt, informed her of lab results and recommendations per Dr. Ivana Canas. Pt states she has concerns based on the findings in the MRI Liver. She states 3 years ago her MRI did not show hemangiomas and now has multiple.  Pt mentioned she was informed sh

## 2019-05-30 ENCOUNTER — TELEPHONE (OUTPATIENT)
Dept: FAMILY MEDICINE CLINIC | Facility: CLINIC | Age: 39
End: 2019-05-30

## 2019-05-30 NOTE — TELEPHONE ENCOUNTER
Called-LVM to call the office. Patients needs remain unclear. It sounds like she had a transient response to the contrast that can be expected without evidence of an allergic reaction.  I would advise her to come in to discuss any concerns she may have and/

## 2019-05-30 NOTE — TELEPHONE ENCOUNTER
----- Message from Ene Blackman DO sent at 5/30/2019  1:40 PM CDT -----  Mammogram/US: NEGATIVE.  Rec starting screening mammograms at age 38 unless any changes

## 2019-05-31 ENCOUNTER — TELEPHONE (OUTPATIENT)
Dept: FAMILY MEDICINE CLINIC | Facility: CLINIC | Age: 39
End: 2019-05-31

## 2019-05-31 NOTE — TELEPHONE ENCOUNTER
Call tx to this nurse from [address] [place] [address] who stated pt explained she was having an allergic reaction.  Spoke to pt who denies having s/s of an allergic rxn/respiratory distress; denies SOB, intercostal retractions, nasal flaring, difficulty breathing/swallowing, edema

## 2019-05-31 NOTE — TELEPHONE ENCOUNTER
Called patient, advised of negative mammogram, she states she is having an allergic reaction to gadolinium inj, specified as Neuropathy - transferred to [facility]

## 2019-05-31 NOTE — TELEPHONE ENCOUNTER
Late entry approx 1:45 pm -  Called patient today, advised of negative / normal mammogram, to rpt in 1 year if desired or continue routine mammograms at age 38.   After advising of negative mammogram, patient then began stating she is having an allergic easton

## 2019-05-31 NOTE — TELEPHONE ENCOUNTER
Spoke to pt, informed her to seek medical care through the ER if having s/s of allergic rxn or pain increases or persists. Patient verbalizes understanding and agrees with plan.

## 2019-07-05 ENCOUNTER — TELEPHONE (OUTPATIENT)
Dept: FAMILY MEDICINE CLINIC | Facility: CLINIC | Age: 39
End: 2019-07-05

## 2019-07-05 DIAGNOSIS — Z00.00 GENERAL MEDICAL EXAMINATION: ICD-10-CM

## 2019-07-05 DIAGNOSIS — Z77.098 CHEMICAL EXPOSURE: ICD-10-CM

## 2019-07-05 DIAGNOSIS — R79.89 ELEVATED FERRITIN: Primary | ICD-10-CM

## 2019-07-05 NOTE — TELEPHONE ENCOUNTER
Pt calling, asking if she could have a CMP added to her lab orders. Pt states she would like to have her kidneys and liver tested after having a dose of gadolinium. Order pended to be completed with CBC, LACHELLE and Vit B12. Please add dx code.

## 2019-07-08 NOTE — TELEPHONE ENCOUNTER
Pt would like to add ferritin to the order. ( bcs pt has fewer prior to the last test which pt believe elevvated the level ).

## 2019-07-10 ENCOUNTER — LAB ENCOUNTER (OUTPATIENT)
Dept: LAB | Facility: REFERENCE LAB | Age: 39
End: 2019-07-10
Attending: FAMILY MEDICINE
Payer: MEDICAID

## 2019-07-10 DIAGNOSIS — R76.8 POSITIVE ANA (ANTINUCLEAR ANTIBODY): ICD-10-CM

## 2019-07-10 DIAGNOSIS — Z77.098 CHEMICAL EXPOSURE: ICD-10-CM

## 2019-07-10 DIAGNOSIS — D64.9 ANEMIA, UNSPECIFIED TYPE: ICD-10-CM

## 2019-07-10 DIAGNOSIS — R79.89 ELEVATED FERRITIN: ICD-10-CM

## 2019-07-10 DIAGNOSIS — E61.1 IRON DEFICIENCY: ICD-10-CM

## 2019-07-10 DIAGNOSIS — R74.8 ELEVATED VITAMIN B12 LEVEL: ICD-10-CM

## 2019-07-10 DIAGNOSIS — D72.818 OTHER DECREASED WHITE BLOOD CELL (WBC) COUNT: ICD-10-CM

## 2019-07-10 DIAGNOSIS — D72.819 LEUKOPENIA, UNSPECIFIED TYPE: ICD-10-CM

## 2019-07-10 LAB
ALBUMIN SERPL-MCNC: 3.4 G/DL (ref 3.4–5)
ALBUMIN/GLOB SERPL: 1 {RATIO} (ref 1–2)
ALP LIVER SERPL-CCNC: 93 U/L (ref 37–98)
ALT SERPL-CCNC: 21 U/L (ref 13–56)
ANION GAP SERPL CALC-SCNC: 5 MMOL/L (ref 0–18)
AST SERPL-CCNC: 18 U/L (ref 15–37)
BASOPHILS # BLD AUTO: 0.04 X10(3) UL (ref 0–0.2)
BASOPHILS NFR BLD AUTO: 0.8 %
BILIRUB SERPL-MCNC: 1.3 MG/DL (ref 0.1–2)
BUN BLD-MCNC: 8 MG/DL (ref 7–18)
BUN/CREAT SERPL: 12.3 (ref 10–20)
CALCIUM BLD-MCNC: 9 MG/DL (ref 8.5–10.1)
CHLORIDE SERPL-SCNC: 106 MMOL/L (ref 98–112)
CO2 SERPL-SCNC: 30 MMOL/L (ref 21–32)
CREAT BLD-MCNC: 0.65 MG/DL (ref 0.55–1.02)
DEPRECATED HBV CORE AB SER IA-ACNC: 11.8 NG/ML (ref 12–160)
DEPRECATED RDW RBC AUTO: 44.7 FL (ref 35.1–46.3)
EOSINOPHIL # BLD AUTO: 0.04 X10(3) UL (ref 0–0.7)
EOSINOPHIL NFR BLD AUTO: 0.8 %
ERYTHROCYTE [DISTWIDTH] IN BLOOD BY AUTOMATED COUNT: 13.3 % (ref 11–15)
GLOBULIN PLAS-MCNC: 3.3 G/DL (ref 2.8–4.4)
GLUCOSE BLD-MCNC: 79 MG/DL (ref 70–99)
HCT VFR BLD AUTO: 36.9 % (ref 35–48)
HGB BLD-MCNC: 11.9 G/DL (ref 12–16)
IMM GRANULOCYTES # BLD AUTO: 0.02 X10(3) UL (ref 0–1)
IMM GRANULOCYTES NFR BLD: 0.4 %
IRON SATURATION: 15 % (ref 15–50)
IRON SERPL-MCNC: 68 UG/DL (ref 50–170)
LYMPHOCYTES # BLD AUTO: 1.12 X10(3) UL (ref 1–4)
LYMPHOCYTES NFR BLD AUTO: 21.4 %
M PROTEIN MFR SERPL ELPH: 6.7 G/DL (ref 6.4–8.2)
MCH RBC QN AUTO: 29.9 PG (ref 26–34)
MCHC RBC AUTO-ENTMCNC: 32.2 G/DL (ref 31–37)
MCV RBC AUTO: 92.7 FL (ref 80–100)
MONOCYTES # BLD AUTO: 0.33 X10(3) UL (ref 0.1–1)
MONOCYTES NFR BLD AUTO: 6.3 %
NEUTROPHILS # BLD AUTO: 3.68 X10 (3) UL (ref 1.5–7.7)
NEUTROPHILS # BLD AUTO: 3.68 X10(3) UL (ref 1.5–7.7)
NEUTROPHILS NFR BLD AUTO: 70.3 %
OSMOLALITY SERPL CALC.SUM OF ELEC: 289 MOSM/KG (ref 275–295)
PATIENT FASTING: YES
PLATELET # BLD AUTO: 220 10(3)UL (ref 150–450)
POTASSIUM SERPL-SCNC: 3.9 MMOL/L (ref 3.5–5.1)
RBC # BLD AUTO: 3.98 X10(6)UL (ref 3.8–5.3)
SODIUM SERPL-SCNC: 141 MMOL/L (ref 136–145)
TOTAL IRON BINDING CAPACITY: 446 UG/DL (ref 240–450)
TRANSFERRIN SERPL-MCNC: 299 MG/DL (ref 200–360)
VIT B12 SERPL-MCNC: 463 PG/ML (ref 193–986)
WBC # BLD AUTO: 5.2 X10(3) UL (ref 4–11)

## 2019-07-10 PROCEDURE — 80053 COMPREHEN METABOLIC PANEL: CPT

## 2019-07-10 PROCEDURE — 86038 ANTINUCLEAR ANTIBODIES: CPT

## 2019-07-10 PROCEDURE — 36415 COLL VENOUS BLD VENIPUNCTURE: CPT

## 2019-07-10 PROCEDURE — 82607 VITAMIN B-12: CPT

## 2019-07-10 PROCEDURE — 85025 COMPLETE CBC W/AUTO DIFF WBC: CPT

## 2019-07-10 PROCEDURE — 84466 ASSAY OF TRANSFERRIN: CPT

## 2019-07-10 PROCEDURE — 83540 ASSAY OF IRON: CPT

## 2019-07-10 PROCEDURE — 82728 ASSAY OF FERRITIN: CPT

## 2019-07-11 LAB — NUCLEAR IGG TITR SER IF: NEGATIVE {TITER}

## 2019-07-22 ENCOUNTER — APPOINTMENT (OUTPATIENT)
Dept: LAB | Facility: REFERENCE LAB | Age: 39
End: 2019-07-22
Attending: FAMILY MEDICINE
Payer: MEDICAID

## 2019-07-22 DIAGNOSIS — R10.9 ABDOMINAL PAIN, UNSPECIFIED ABDOMINAL LOCATION: ICD-10-CM

## 2019-07-22 PROCEDURE — 87338 HPYLORI STOOL AG IA: CPT

## 2019-07-23 LAB — H PYLORI AG STL QL IA: NEGATIVE

## 2019-09-10 ENCOUNTER — OFFICE VISIT (OUTPATIENT)
Dept: FAMILY MEDICINE CLINIC | Facility: CLINIC | Age: 39
End: 2019-09-10
Payer: MEDICAID

## 2019-09-10 VITALS
HEIGHT: 62.3 IN | RESPIRATION RATE: 16 BRPM | HEART RATE: 77 BPM | OXYGEN SATURATION: 98 % | SYSTOLIC BLOOD PRESSURE: 100 MMHG | WEIGHT: 99 LBS | TEMPERATURE: 98 F | BODY MASS INDEX: 17.99 KG/M2 | DIASTOLIC BLOOD PRESSURE: 62 MMHG

## 2019-09-10 DIAGNOSIS — T50.8X5A ADVERSE EFFECT OF CONTRAST MEDIA, INITIAL ENCOUNTER: ICD-10-CM

## 2019-09-10 DIAGNOSIS — R42 VERTIGO: ICD-10-CM

## 2019-09-10 DIAGNOSIS — G62.9 NEUROPATHY: Primary | ICD-10-CM

## 2019-09-10 DIAGNOSIS — H93.8X2 EAR PRESSURE, LEFT: ICD-10-CM

## 2019-09-10 DIAGNOSIS — D18.03 HEMANGIOMA OF INTRA-ABDOMINAL STRUCTURE: ICD-10-CM

## 2019-09-10 DIAGNOSIS — R21 RASH AND NONSPECIFIC SKIN ERUPTION: ICD-10-CM

## 2019-09-10 DIAGNOSIS — R60.1 GENERALIZED EDEMA: ICD-10-CM

## 2019-09-10 DIAGNOSIS — M25.50 ARTHRALGIA, UNSPECIFIED JOINT: ICD-10-CM

## 2019-09-10 DIAGNOSIS — G44.89 OTHER HEADACHE SYNDROME: ICD-10-CM

## 2019-09-10 DIAGNOSIS — R53.83 OTHER FATIGUE: ICD-10-CM

## 2019-09-10 DIAGNOSIS — M79.10 MYALGIA: ICD-10-CM

## 2019-09-10 PROBLEM — N63.0 BREAST MASS IN FEMALE: Status: RESOLVED | Noted: 2019-05-09 | Resolved: 2019-09-10

## 2019-09-10 PROCEDURE — 99215 OFFICE O/P EST HI 40 MIN: CPT | Performed by: FAMILY MEDICINE

## 2019-09-10 NOTE — PROGRESS NOTES
HPI:   Patient presents with: Other: s/s post MRI gadolinium inj  Musculoskeletal Problem: myalgia, arthralgia  Headache  Ear Pain  Neuropathy  Fatigue  Dizziness  Liver hemangiomas  Rash  Edema    Ke Oliver is a 42year old female.     She primaril 3.4 07/10/2019 11:07 AM    TP 6.7 07/10/2019 11:07 AM    ALKPHO 93 07/10/2019 11:07 AM    AST 18 07/10/2019 11:07 AM    ALT 21 07/10/2019 11:07 AM    BILT 1.3 07/10/2019 11:07 AM    TSH 1.420 05/09/2019 09:11 AM            Medications:    Current Outpatien lb    GENERAL: well developed, thin female  in no apparent distress  SKIN: skin color wnl,  no suspicious lesions     HEENT: atraumatic, normocephalic, nose clear; throat clear   EARS: canals clear B, TM: wnl B  EYES: PERRLA, EOMI,conjunctiva clear, no dis GROUP  - HEPATOLOGY - INTERNAL  - CBC WITH DIFFERENTIAL WITH PLATELET; Future  - COMP METABOLIC PANEL (14); Future  - ASSAY, THYROID STIM HORMONE; Future    4.  Generalized edema  Initially associated with the above, now improved,  - OP REFERRAL TO [facility] Visit:  Requested Prescriptions      No prescriptions requested or ordered in this encounter       Imaging & Consults:  OP REFERRAL TO [address] [place] [address]    Andera Palmer MD

## 2019-09-12 ENCOUNTER — TELEPHONE (OUTPATIENT)
Dept: FAMILY MEDICINE CLINIC | Facility: CLINIC | Age: 39
End: 2019-09-12

## 2020-01-08 ENCOUNTER — LAB ENCOUNTER (OUTPATIENT)
Dept: LAB | Facility: REFERENCE LAB | Age: 40
End: 2020-01-08
Attending: FAMILY MEDICINE
Payer: MEDICAID

## 2020-01-08 DIAGNOSIS — M79.10 MYALGIA: ICD-10-CM

## 2020-01-08 DIAGNOSIS — G44.89 OTHER HEADACHE SYNDROME: ICD-10-CM

## 2020-01-08 DIAGNOSIS — D18.03 LIVER HEMANGIOMA: ICD-10-CM

## 2020-01-08 DIAGNOSIS — D50.8 OTHER IRON DEFICIENCY ANEMIA: ICD-10-CM

## 2020-01-08 DIAGNOSIS — M25.50 ARTHRALGIA, UNSPECIFIED JOINT: ICD-10-CM

## 2020-01-08 DIAGNOSIS — R60.1 GENERALIZED EDEMA: ICD-10-CM

## 2020-01-08 DIAGNOSIS — E78.5 DYSLIPIDEMIA: ICD-10-CM

## 2020-01-08 DIAGNOSIS — G62.9 NEUROPATHY: ICD-10-CM

## 2020-01-08 LAB
ALBUMIN SERPL-MCNC: 3.4 G/DL (ref 3.4–5)
ALBUMIN/GLOB SERPL: 1 {RATIO} (ref 1–2)
ALP LIVER SERPL-CCNC: 85 U/L (ref 37–98)
ALT SERPL-CCNC: 21 U/L (ref 13–56)
ANION GAP SERPL CALC-SCNC: 4 MMOL/L (ref 0–18)
AST SERPL-CCNC: 17 U/L (ref 15–37)
BASOPHILS # BLD AUTO: 0.04 X10(3) UL (ref 0–0.2)
BASOPHILS NFR BLD AUTO: 0.7 %
BILIRUB SERPL-MCNC: 1.1 MG/DL (ref 0.1–2)
BUN BLD-MCNC: 7 MG/DL (ref 7–18)
BUN/CREAT SERPL: 10.6 (ref 10–20)
CALCIUM BLD-MCNC: 8.7 MG/DL (ref 8.5–10.1)
CHLORIDE SERPL-SCNC: 105 MMOL/L (ref 98–112)
CHOLEST SMN-MCNC: 170 MG/DL (ref ?–200)
CO2 SERPL-SCNC: 32 MMOL/L (ref 21–32)
CREAT BLD-MCNC: 0.66 MG/DL (ref 0.55–1.02)
DEPRECATED RDW RBC AUTO: 42.5 FL (ref 35.1–46.3)
EOSINOPHIL # BLD AUTO: 0.07 X10(3) UL (ref 0–0.7)
EOSINOPHIL NFR BLD AUTO: 1.2 %
ERYTHROCYTE [DISTWIDTH] IN BLOOD BY AUTOMATED COUNT: 12.4 % (ref 11–15)
GLOBULIN PLAS-MCNC: 3.5 G/DL (ref 2.8–4.4)
GLUCOSE BLD-MCNC: 82 MG/DL (ref 70–99)
HCT VFR BLD AUTO: 38.5 % (ref 35–48)
HDLC SERPL-MCNC: 52 MG/DL (ref 40–59)
HGB BLD-MCNC: 12.5 G/DL (ref 12–16)
IMM GRANULOCYTES # BLD AUTO: 0.01 X10(3) UL (ref 0–1)
IMM GRANULOCYTES NFR BLD: 0.2 %
IRON SATURATION: 14 % (ref 15–50)
IRON SERPL-MCNC: 70 UG/DL (ref 50–170)
LDLC SERPL CALC-MCNC: 103 MG/DL (ref ?–100)
LYMPHOCYTES # BLD AUTO: 1.33 X10(3) UL (ref 1–4)
LYMPHOCYTES NFR BLD AUTO: 23 %
M PROTEIN MFR SERPL ELPH: 6.9 G/DL (ref 6.4–8.2)
MCH RBC QN AUTO: 30.5 PG (ref 26–34)
MCHC RBC AUTO-ENTMCNC: 32.5 G/DL (ref 31–37)
MCV RBC AUTO: 93.9 FL (ref 80–100)
MONOCYTES # BLD AUTO: 0.36 X10(3) UL (ref 0.1–1)
MONOCYTES NFR BLD AUTO: 6.2 %
NEUTROPHILS # BLD AUTO: 3.97 X10 (3) UL (ref 1.5–7.7)
NEUTROPHILS # BLD AUTO: 3.97 X10(3) UL (ref 1.5–7.7)
NEUTROPHILS NFR BLD AUTO: 68.7 %
NONHDLC SERPL-MCNC: 118 MG/DL (ref ?–130)
OSMOLALITY SERPL CALC.SUM OF ELEC: 289 MOSM/KG (ref 275–295)
PATIENT FASTING Y/N/NP: YES
PATIENT FASTING Y/N/NP: YES
PLATELET # BLD AUTO: 202 10(3)UL (ref 150–450)
POTASSIUM SERPL-SCNC: 3.4 MMOL/L (ref 3.5–5.1)
RBC # BLD AUTO: 4.1 X10(6)UL (ref 3.8–5.3)
SODIUM SERPL-SCNC: 141 MMOL/L (ref 136–145)
TOTAL IRON BINDING CAPACITY: 493 UG/DL (ref 240–450)
TRANSFERRIN SERPL-MCNC: 331 MG/DL (ref 200–360)
TRIGL SERPL-MCNC: 75 MG/DL (ref 30–149)
TSI SER-ACNC: 1.67 MIU/ML (ref 0.36–3.74)
VLDLC SERPL CALC-MCNC: 15 MG/DL (ref 0–30)
WBC # BLD AUTO: 5.8 X10(3) UL (ref 4–11)

## 2020-01-08 PROCEDURE — 80053 COMPREHEN METABOLIC PANEL: CPT

## 2020-01-08 PROCEDURE — 84466 ASSAY OF TRANSFERRIN: CPT

## 2020-01-08 PROCEDURE — 83540 ASSAY OF IRON: CPT

## 2020-01-08 PROCEDURE — 84443 ASSAY THYROID STIM HORMONE: CPT

## 2020-01-08 PROCEDURE — 85025 COMPLETE CBC W/AUTO DIFF WBC: CPT

## 2020-01-08 PROCEDURE — 80061 LIPID PANEL: CPT

## 2020-01-08 PROCEDURE — 36415 COLL VENOUS BLD VENIPUNCTURE: CPT

## 2020-01-12 DIAGNOSIS — E61.1 IRON DEFICIENCY: Primary | ICD-10-CM

## 2020-01-12 DIAGNOSIS — E87.6 HYPOKALEMIA: ICD-10-CM

## 2020-01-14 ENCOUNTER — HOSPITAL ENCOUNTER (OUTPATIENT)
Dept: ULTRASOUND IMAGING | Age: 40
Discharge: HOME | End: 2020-01-14
Attending: FAMILY MEDICINE
Payer: MEDICAID

## 2020-01-14 DIAGNOSIS — D18.03 LIVER HEMANGIOMA: ICD-10-CM

## 2020-01-14 PROCEDURE — 76705 ECHO EXAM OF ABDOMEN: CPT | Performed by: FAMILY MEDICINE

## 2020-01-27 ENCOUNTER — OFFICE VISIT (OUTPATIENT)
Dept: SURGERY | Facility: CLINIC | Age: 40
End: 2020-01-27
Payer: MEDICAID

## 2020-01-27 VITALS
OXYGEN SATURATION: 100 % | HEART RATE: 73 BPM | WEIGHT: 101 LBS | DIASTOLIC BLOOD PRESSURE: 64 MMHG | RESPIRATION RATE: 16 BRPM | BODY MASS INDEX: 18 KG/M2 | SYSTOLIC BLOOD PRESSURE: 101 MMHG

## 2020-01-27 DIAGNOSIS — D18.00 MULTIPLE HEMANGIOMAS: Primary | ICD-10-CM

## 2020-01-27 NOTE — PROGRESS NOTES
[facility] at [facility]  [address], [address]  [address]  [phone] ([phone] daily., Disp: , Rfl:   •  Cholecalciferol (VITAMIN D) 2000 UNITS Oral Tab, Take 2,000 Units by mouth 2 (two) times daily.   , Disp: , Rfl:     Exam:  Vitals per chart  Gen: NAD  HEENT: Anicteric  Lymph: no cervical LAD  Chest: no angiomata  CV: RRR, no murm

## 2020-09-22 ENCOUNTER — OFFICE VISIT (OUTPATIENT)
Dept: FAMILY MEDICINE CLINIC | Facility: CLINIC | Age: 40
End: 2020-09-22
Payer: MEDICAID

## 2020-09-22 VITALS
DIASTOLIC BLOOD PRESSURE: 50 MMHG | WEIGHT: 98.31 LBS | HEART RATE: 51 BPM | HEIGHT: 62.3 IN | SYSTOLIC BLOOD PRESSURE: 84 MMHG | RESPIRATION RATE: 16 BRPM | BODY MASS INDEX: 17.86 KG/M2 | OXYGEN SATURATION: 99 %

## 2020-09-22 DIAGNOSIS — M79.10 MYALGIA: ICD-10-CM

## 2020-09-22 DIAGNOSIS — R53.83 OTHER FATIGUE: ICD-10-CM

## 2020-09-22 DIAGNOSIS — L65.9 HAIR LOSS: ICD-10-CM

## 2020-09-22 DIAGNOSIS — R79.89 ELEVATED FERRITIN: ICD-10-CM

## 2020-09-22 DIAGNOSIS — E55.9 VITAMIN D DEFICIENCY: ICD-10-CM

## 2020-09-22 DIAGNOSIS — F41.9 ANXIETY: ICD-10-CM

## 2020-09-22 DIAGNOSIS — M25.50 ARTHRALGIA, UNSPECIFIED JOINT: ICD-10-CM

## 2020-09-22 DIAGNOSIS — R21 RASH AND NONSPECIFIC SKIN ERUPTION: ICD-10-CM

## 2020-09-22 DIAGNOSIS — D18.03 LIVER HEMANGIOMA: ICD-10-CM

## 2020-09-22 DIAGNOSIS — E61.1 IRON DEFICIENCY: ICD-10-CM

## 2020-09-22 DIAGNOSIS — G62.9 NEUROPATHY: ICD-10-CM

## 2020-09-22 DIAGNOSIS — N92.6 IRREGULAR MENSTRUAL CYCLE: ICD-10-CM

## 2020-09-22 DIAGNOSIS — Z00.00 ROUTINE MEDICAL EXAM: Primary | ICD-10-CM

## 2020-09-22 LAB
BACTERIA UR QL AUTO: NEGATIVE /HPF
RBC #/AREA URNS AUTO: 2 /HPF
WBC #/AREA URNS AUTO: <1 /HPF

## 2020-09-22 PROCEDURE — 99213 OFFICE O/P EST LOW 20 MIN: CPT | Performed by: FAMILY MEDICINE

## 2020-09-22 PROCEDURE — 3008F BODY MASS INDEX DOCD: CPT | Performed by: FAMILY MEDICINE

## 2020-09-22 PROCEDURE — 81015 MICROSCOPIC EXAM OF URINE: CPT | Performed by: FAMILY MEDICINE

## 2020-09-22 PROCEDURE — 3074F SYST BP LT 130 MM HG: CPT | Performed by: FAMILY MEDICINE

## 2020-09-22 PROCEDURE — 3078F DIAST BP <80 MM HG: CPT | Performed by: FAMILY MEDICINE

## 2020-09-22 PROCEDURE — 99395 PREV VISIT EST AGE 18-39: CPT | Performed by: FAMILY MEDICINE

## 2020-09-23 NOTE — H&P
HPI:   Patient presents with:  Physical  Anxiety  Musculoskeletal Problem  Fatigue  Anemia  Pain      Janie Sprague is a 47year old female who presents for a complete physical exam without pap/gyne exam.     Patient has complaints of:  · Arthralgias, m in 2010.    • Demyelinating lesion ([facility] [address]    • Depression       Past Surgical History:   Procedure Laterality Date   • OTHER SURGICAL HISTORY Right dec 1998    Authroscopic, torn miniscus cartalidge      Family History   Problem Relation Age of Onset   • Oth no apparent distress  SKIN: no suspicious lesions, color wnl  HEENT: atraumatic, normocephalic, nose and throat are clear; dentition good, EARS: canals clear, TM wnl B  EYES:PERRLA, EOMI,conjunctiva are clear, no discharge; no nystagmus  NECK: supple, no L TSH W REFLEX TO FREE T4; Future  - VITAMIN D, 25-HYDROXY; Future  - UA MICROSCOPIC ONLY, URINE; Future    2. Iron deficiency  Recheck now    3. Liver hemangioma, multiple  Last ultrasound 12/2019  Follow-up with hepatologist as recommended    4.  Neuropathy Anxiety  Generalized and situational associated with multiple complaints and symptoms as above which are debilitating    The patient indicates understanding of the above recommendations and agrees to the above plan.   Follow up: as above    Orders Placed Th

## 2020-09-25 ENCOUNTER — TELEPHONE (OUTPATIENT)
Dept: FAMILY MEDICINE CLINIC | Facility: CLINIC | Age: 40
End: 2020-09-25

## 2020-09-25 DIAGNOSIS — L65.9 HAIR LOSS: Primary | ICD-10-CM

## 2020-09-25 DIAGNOSIS — N92.6 IRREGULAR MENSTRUAL CYCLE: ICD-10-CM

## 2020-09-25 NOTE — PROGRESS NOTES
Royce Palacios,    Below are the results of your recently performed labs/tests:    Your urinalysis test was essentially within normal limits.     Take care,     Butch Bassett MD  9/24/2020    (Report(s) are attached, future lab/test orders or a

## 2020-09-25 NOTE — TELEPHONE ENCOUNTER
Luz Marina Chapa called from lab inquiring about below. She is looking for a call back to confirm if this was entered in error to proceed. [phone].

## 2020-09-25 NOTE — TELEPHONE ENCOUNTER
Nikita Worthington called regarding blood sample tube. They did not receive this, but did receive the urine. She is wondering if it is in the office still, please advise.

## 2020-09-25 NOTE — TELEPHONE ENCOUNTER
Spoke to [employer], they state the [facility] was collected but was a mistake on the MA's end, will need to order another LH to be completed when patient does her other labs. Pended.

## 2020-10-06 ENCOUNTER — TELEPHONE (OUTPATIENT)
Dept: FAMILY MEDICINE CLINIC | Facility: CLINIC | Age: 40
End: 2020-10-06

## 2020-10-12 NOTE — TELEPHONE ENCOUNTER
Attempted to reach pt by phone; no answer, continued to ring x 10 times then went to fast busy signal.   Spoke to [facility] who confirmed no PAs are required for the lab work ordered by Dr. Rayo Hassan based on CPT codes.  Per rep, labs will be covered by [facility]

## 2020-10-14 NOTE — TELEPHONE ENCOUNTER
Spoke to pt, informed her of [facility] response; pt verbalizes understanding.

## 2020-10-15 ENCOUNTER — OFFICE VISIT (OUTPATIENT)
Dept: RHEUMATOLOGY | Facility: CLINIC | Age: 40
End: 2020-10-15
Payer: MEDICAID

## 2020-10-15 VITALS
BODY MASS INDEX: 17.81 KG/M2 | WEIGHT: 98 LBS | DIASTOLIC BLOOD PRESSURE: 54 MMHG | HEART RATE: 73 BPM | SYSTOLIC BLOOD PRESSURE: 82 MMHG | HEIGHT: 62.3 IN

## 2020-10-15 DIAGNOSIS — M79.10 MYALGIA: ICD-10-CM

## 2020-10-15 DIAGNOSIS — M25.50 POLYARTHRALGIA: Primary | ICD-10-CM

## 2020-10-15 PROCEDURE — 3074F SYST BP LT 130 MM HG: CPT | Performed by: INTERNAL MEDICINE

## 2020-10-15 PROCEDURE — 3078F DIAST BP <80 MM HG: CPT | Performed by: INTERNAL MEDICINE

## 2020-10-15 PROCEDURE — 99244 OFF/OP CNSLTJ NEW/EST MOD 40: CPT | Performed by: INTERNAL MEDICINE

## 2020-10-15 PROCEDURE — 3008F BODY MASS INDEX DOCD: CPT | Performed by: INTERNAL MEDICINE

## 2020-10-15 NOTE — PROGRESS NOTES
Phillip Moore is a 47year old female who presents for Patient presents with:  Consult: Per Dr. Nidia Owens  Joint Pain  Rash: Come and go, hands and arms  Fatigue  Leg Pain: Discoloration (purple)  .    HPI:   CC: joint pain and fatigue  Consult: referred by [facility] Medication Sig Dispense Refill   • Vitamin B-12 250 MCG Oral Tab Take 250 mcg by mouth daily. • Cholecalciferol (VITAMIN D) 2000 UNITS Oral Tab Take 2,000 Units by mouth 2 (two) times daily.           Past Medical History:   Diagnosis Date   • Copper (44.5 kg)   LMP 09/14/2020   BMI 17.75 kg/m²   GEN: AAOx3, NAD  HEENT: EOMI, PERRLA, no injection or icterus, oral mucosa moist, no oral lesions. No lymphadenopathy. No facial rash  CVS: RRR, no murmurs rubs or gallops. Equal 2+ distal pulses.    LUNGS: CTA

## 2020-11-09 NOTE — TELEPHONE ENCOUNTER
----- Message from Keiko Martell MD sent at 11/9/2020 12:20 PM EST -----  No change   2 weeks  ----- Message -----  From: Gary Hayes  Sent: 11/9/2020  12:17 PM EST  To: Keiko Martell MD    INR- 2.5 Pt called regarding obtaining prior auth for specialty labs ordered. Pt stated she called her insurance to see potential costs, and her insurance informed her that we need to submit for prior auth. Pts insurance is [facility].

## 2020-12-01 ENCOUNTER — LAB ENCOUNTER (OUTPATIENT)
Dept: LAB | Facility: HOSPITAL | Age: 40
End: 2020-12-01
Attending: FAMILY MEDICINE
Payer: MEDICAID

## 2020-12-01 DIAGNOSIS — N92.6 IRREGULAR MENSTRUAL CYCLE: ICD-10-CM

## 2020-12-01 DIAGNOSIS — E61.1 IRON DEFICIENCY: ICD-10-CM

## 2020-12-01 DIAGNOSIS — G62.9 NEUROPATHY: ICD-10-CM

## 2020-12-01 DIAGNOSIS — R53.83 OTHER FATIGUE: ICD-10-CM

## 2020-12-01 DIAGNOSIS — L65.9 HAIR LOSS: ICD-10-CM

## 2020-12-01 DIAGNOSIS — M25.50 POLYARTHRALGIA: ICD-10-CM

## 2020-12-01 DIAGNOSIS — Z00.00 ROUTINE MEDICAL EXAM: ICD-10-CM

## 2020-12-01 DIAGNOSIS — M79.10 MYALGIA: ICD-10-CM

## 2020-12-01 DIAGNOSIS — E87.6 HYPOKALEMIA: ICD-10-CM

## 2020-12-01 DIAGNOSIS — R79.89 ELEVATED FERRITIN: ICD-10-CM

## 2020-12-01 PROCEDURE — 85613 RUSSELL VIPER VENOM DILUTED: CPT

## 2020-12-01 PROCEDURE — 82607 VITAMIN B-12: CPT

## 2020-12-01 PROCEDURE — 82670 ASSAY OF TOTAL ESTRADIOL: CPT

## 2020-12-01 PROCEDURE — 82533 TOTAL CORTISOL: CPT

## 2020-12-01 PROCEDURE — 84466 ASSAY OF TRANSFERRIN: CPT

## 2020-12-01 PROCEDURE — 84144 ASSAY OF PROGESTERONE: CPT

## 2020-12-01 PROCEDURE — 83001 ASSAY OF GONADOTROPIN (FSH): CPT

## 2020-12-01 PROCEDURE — 86225 DNA ANTIBODY NATIVE: CPT

## 2020-12-01 PROCEDURE — 82627 DEHYDROEPIANDROSTERONE: CPT

## 2020-12-01 PROCEDURE — 86146 BETA-2 GLYCOPROTEIN ANTIBODY: CPT | Performed by: INTERNAL MEDICINE

## 2020-12-01 PROCEDURE — 86431 RHEUMATOID FACTOR QUANT: CPT | Performed by: INTERNAL MEDICINE

## 2020-12-01 PROCEDURE — 82728 ASSAY OF FERRITIN: CPT

## 2020-12-01 PROCEDURE — 86235 NUCLEAR ANTIGEN ANTIBODY: CPT

## 2020-12-01 PROCEDURE — 85730 THROMBOPLASTIN TIME PARTIAL: CPT

## 2020-12-01 PROCEDURE — 83540 ASSAY OF IRON: CPT

## 2020-12-01 PROCEDURE — 85598 HEXAGNAL PHOSPH PLTLT NEUTRL: CPT

## 2020-12-01 PROCEDURE — 83002 ASSAY OF GONADOTROPIN (LH): CPT

## 2020-12-01 PROCEDURE — 86140 C-REACTIVE PROTEIN: CPT | Performed by: INTERNAL MEDICINE

## 2020-12-01 PROCEDURE — 86147 CARDIOLIPIN ANTIBODY EA IG: CPT

## 2020-12-01 PROCEDURE — 85390 FIBRINOLYSINS SCREEN I&R: CPT

## 2020-12-01 PROCEDURE — 86200 CCP ANTIBODY: CPT | Performed by: INTERNAL MEDICINE

## 2020-12-01 PROCEDURE — 84146 ASSAY OF PROLACTIN: CPT

## 2020-12-01 PROCEDURE — 80053 COMPREHEN METABOLIC PANEL: CPT

## 2020-12-01 PROCEDURE — 84443 ASSAY THYROID STIM HORMONE: CPT

## 2020-12-01 PROCEDURE — 82306 VITAMIN D 25 HYDROXY: CPT

## 2020-12-01 PROCEDURE — 85610 PROTHROMBIN TIME: CPT

## 2020-12-01 PROCEDURE — 84403 ASSAY OF TOTAL TESTOSTERONE: CPT

## 2020-12-01 PROCEDURE — 86038 ANTINUCLEAR ANTIBODIES: CPT

## 2020-12-01 PROCEDURE — 85652 RBC SED RATE AUTOMATED: CPT | Performed by: INTERNAL MEDICINE

## 2020-12-01 PROCEDURE — 85025 COMPLETE CBC W/AUTO DIFF WBC: CPT

## 2020-12-01 PROCEDURE — 36415 COLL VENOUS BLD VENIPUNCTURE: CPT

## 2020-12-01 PROCEDURE — 84402 ASSAY OF FREE TESTOSTERONE: CPT

## 2020-12-12 NOTE — PROGRESS NOTES
Royce Palacios,    Below are the results of your recently performed labs/tests:     All results are within NORMAL limits EXCEPT:    Your complete blood count showed mild anemia with a mildly decreased ferritin and mildly increased total iron binding capa

## 2020-12-22 ENCOUNTER — HOSPITAL ENCOUNTER (OUTPATIENT)
Dept: ULTRASOUND IMAGING | Age: 40
Discharge: HOME | End: 2020-12-22
Attending: INTERNAL MEDICINE
Payer: MEDICAID

## 2020-12-22 DIAGNOSIS — D18.00 MULTIPLE HEMANGIOMAS: ICD-10-CM

## 2020-12-22 PROCEDURE — 76705 ECHO EXAM OF ABDOMEN: CPT | Performed by: INTERNAL MEDICINE

## 2021-01-25 ENCOUNTER — VIRTUAL PHONE E/M (OUTPATIENT)
Dept: SURGERY | Facility: CLINIC | Age: 41
End: 2021-01-25
Payer: MEDICAID

## 2021-01-25 DIAGNOSIS — D18.00 MULTIPLE HEMANGIOMAS: Primary | ICD-10-CM

## 2021-01-25 NOTE — PROGRESS NOTES
Before the initiation of today's documented service, the patient or patient guardian verbally consented to virtual/remote treatment. More than 50% of the listed time was spent counseling or coordinating care.   The topics discussed are listed in the assessm by mouth daily. , Disp: , Rfl:   •  Cholecalciferol (VITAMIN D) 2000 UNITS Oral Tab, Take 2,000 Units by mouth 2 (two) times daily.   , Disp: , Rfl:     Exam:  N/A    Assessment and Plan:  47year old with multiple hemangioma    - Discussed that these are be

## 2021-05-03 ENCOUNTER — TELEMEDICINE (OUTPATIENT)
Dept: FAMILY MEDICINE CLINIC | Facility: CLINIC | Age: 41
End: 2021-05-03

## 2021-05-03 ENCOUNTER — TELEPHONE (OUTPATIENT)
Dept: FAMILY MEDICINE CLINIC | Facility: CLINIC | Age: 41
End: 2021-05-03

## 2021-05-03 DIAGNOSIS — R63.4 WEIGHT LOSS: ICD-10-CM

## 2021-05-03 DIAGNOSIS — B83.9 WORMS IN STOOL: Primary | ICD-10-CM

## 2021-05-03 PROCEDURE — 99213 OFFICE O/P EST LOW 20 MIN: CPT | Performed by: NURSE PRACTITIONER

## 2021-05-03 NOTE — TELEPHONE ENCOUNTER
Pt called looking for clarification regarding her visit 5/7/21. Pt believes she has discovered the reason for unable to gain weight. She has found parasites in stool.  She collected them in a vile and read online to store them in a specimen container with 7

## 2021-05-03 NOTE — PATIENT INSTRUCTIONS
Tapeworm Infection   Tapeworm is a parasite that grows in the intestines (gut). They can live in your body for many years. Or they can leave the body during a bowel movement. Tapeworms can cause an infection.   The eggs are found in feces of infected landen wash your hands after using the toilet. Also wash your hands before handling or eating any food or drink. You could reinfect yourself for several months after treatment by coming into contact with the eggs or larvae that remain in your feces.   Follow-up ca

## 2021-05-03 NOTE — TELEPHONE ENCOUNTER
Confirmed with Tristian Pierre, can schedule pt for a virtual visit today. I spoke with pt, agreeable to a virtual visit today with HOWIE. I will cancel appt scheduled later this week with Dr. Leopold Kehr - pt agrees.

## 2021-05-03 NOTE — PROGRESS NOTES
Due to the real risk of possible exposure to Coronavirus (CoV-2, COVID-19) in the office/medical building and recommendations for social distancing (key to mitigation/limiting the spread of the virus) a Virtual or Telemedicine visit over the phone was perf above.  Coding/billing information is submitted for this visit based on complexity of care and/or time spent for the visit. HPI:  Patient presents with:  Acute:  Worms in stool      Maddie Woody is a 46year old female who calls for complaints of:    W affect. Lactose Intolerant      DIARRHEA, PAIN  Current Outpatient Medications   Medication Sig Dispense Refill   • Vitamin B-12 250 MCG Oral Tab Take 250 mcg by mouth daily.      • Cholecalciferol (VITAMIN D) 2000 UNITS Oral Tab Take 2,000 Units by mo and  wheezing, worsening fever, cough and mental status. The patient (or patient's parent if <17 y/o) indicates understanding of the above recommendations and agrees to the above plan.     Orders Placed This Encounter      Macroscopic Parasite exam [E]

## 2021-05-03 NOTE — TELEPHONE ENCOUNTER
Refer her to GI for treatment/management since patients don't always respond with initial treatment(s)

## 2021-05-03 NOTE — TELEPHONE ENCOUNTER
CARMEN to Dr. Rae Camarillo. I saw this patient today due to her finding visible worms in her stool. I am checking stool for ova and parasites, crypto/giardia. She also has some worms in a vial to bring to lab for ID. I referred her to infectious disease as well.  I d

## 2021-05-04 ENCOUNTER — LAB ENCOUNTER (OUTPATIENT)
Dept: LAB | Facility: HOSPITAL | Age: 41
End: 2021-05-04
Attending: NURSE PRACTITIONER
Payer: MEDICAID

## 2021-05-04 DIAGNOSIS — R63.4 WEIGHT LOSS: ICD-10-CM

## 2021-05-04 DIAGNOSIS — B83.9 WORMS IN STOOL: ICD-10-CM

## 2021-05-04 PROCEDURE — 87169 MACROSCOPIC EXAM PARASITE: CPT

## 2021-05-06 ENCOUNTER — LAB ENCOUNTER (OUTPATIENT)
Dept: LAB | Facility: HOSPITAL | Age: 41
End: 2021-05-06
Attending: NURSE PRACTITIONER
Payer: MEDICAID

## 2021-05-06 ENCOUNTER — TELEPHONE (OUTPATIENT)
Dept: FAMILY MEDICINE CLINIC | Facility: CLINIC | Age: 41
End: 2021-05-06

## 2021-05-06 DIAGNOSIS — B83.9 WORMS IN STOOL: ICD-10-CM

## 2021-05-06 DIAGNOSIS — R63.4 WEIGHT LOSS: ICD-10-CM

## 2021-05-06 PROCEDURE — 87329 GIARDIA AG IA: CPT

## 2021-05-06 PROCEDURE — 87272 CRYPTOSPORIDIUM AG IF: CPT

## 2021-05-06 NOTE — TELEPHONE ENCOUNTER
Spoke to patient. She reports she dropped off 3 different-appearing worm specimens to lab and is confused by the report that they could not ID one specimen. I called lab and their pathologist is on lunch, they will call me back to discuss.

## 2021-05-06 NOTE — TELEPHONE ENCOUNTER
Spoke with lab, they state they suspect the worms were destroyed with alcohol preservation. Patient notified. Also notified she needs to go back to lab to get a green specimen container for O&P, that test was initially cancelled by lab.  She verbalized und

## 2021-05-19 ENCOUNTER — TELEPHONE (OUTPATIENT)
Dept: FAMILY MEDICINE CLINIC | Facility: CLINIC | Age: 41
End: 2021-05-19

## 2021-05-20 ENCOUNTER — LAB ENCOUNTER (OUTPATIENT)
Dept: LAB | Facility: REFERENCE LAB | Age: 41
End: 2021-05-20
Attending: NURSE PRACTITIONER
Payer: MEDICAID

## 2021-05-20 DIAGNOSIS — B83.9 WORMS IN STOOL: ICD-10-CM

## 2021-05-20 DIAGNOSIS — R63.4 WEIGHT LOSS: ICD-10-CM

## 2021-05-20 PROCEDURE — 87209 SMEAR COMPLEX STAIN: CPT

## 2021-05-20 PROCEDURE — 87177 OVA AND PARASITES SMEARS: CPT

## 2021-12-07 ENCOUNTER — OFFICE VISIT (OUTPATIENT)
Dept: FAMILY MEDICINE CLINIC | Facility: CLINIC | Age: 41
End: 2021-12-07
Payer: MEDICAID

## 2021-12-07 ENCOUNTER — LAB ENCOUNTER (OUTPATIENT)
Dept: LAB | Facility: HOSPITAL | Age: 41
End: 2021-12-07
Attending: FAMILY MEDICINE
Payer: MEDICAID

## 2021-12-07 VITALS
HEART RATE: 80 BPM | OXYGEN SATURATION: 98 % | DIASTOLIC BLOOD PRESSURE: 54 MMHG | HEIGHT: 62.3 IN | SYSTOLIC BLOOD PRESSURE: 80 MMHG | WEIGHT: 89.63 LBS | BODY MASS INDEX: 16.29 KG/M2

## 2021-12-07 DIAGNOSIS — H92.03 OTALGIA OF BOTH EARS: ICD-10-CM

## 2021-12-07 DIAGNOSIS — E61.1 IRON DEFICIENCY: ICD-10-CM

## 2021-12-07 DIAGNOSIS — Z78.9 VEGAN DIET: ICD-10-CM

## 2021-12-07 DIAGNOSIS — E53.8 VITAMIN B12 DEFICIENCY: ICD-10-CM

## 2021-12-07 DIAGNOSIS — B82.9 PRESENCE OF PARASITES IN STOOL: ICD-10-CM

## 2021-12-07 DIAGNOSIS — E55.9 VITAMIN D DEFICIENCY: ICD-10-CM

## 2021-12-07 DIAGNOSIS — M79.10 MYALGIA: ICD-10-CM

## 2021-12-07 DIAGNOSIS — R22.1 LOCALIZED SWELLING, MASS AND LUMP, NECK: Primary | ICD-10-CM

## 2021-12-07 DIAGNOSIS — M25.50 ARTHRALGIA, UNSPECIFIED JOINT: ICD-10-CM

## 2021-12-07 DIAGNOSIS — G89.29 OTHER CHRONIC PAIN: ICD-10-CM

## 2021-12-07 DIAGNOSIS — H93.13 TINNITUS AURIUM, BILATERAL: ICD-10-CM

## 2021-12-07 DIAGNOSIS — F41.9 ANXIETY: ICD-10-CM

## 2021-12-07 DIAGNOSIS — Z00.00 ROUTINE MEDICAL EXAM: ICD-10-CM

## 2021-12-07 DIAGNOSIS — R63.4 WEIGHT LOSS: ICD-10-CM

## 2021-12-07 DIAGNOSIS — G62.9 NEUROPATHY: ICD-10-CM

## 2021-12-07 DIAGNOSIS — T50.8X5S ADVERSE EFFECT OF CONTRAST MEDIA, SEQUELA: ICD-10-CM

## 2021-12-07 DIAGNOSIS — D18.03 LIVER HEMANGIOMA: ICD-10-CM

## 2021-12-07 DIAGNOSIS — L72.9 CYST OF SKIN: ICD-10-CM

## 2021-12-07 PROCEDURE — 84443 ASSAY THYROID STIM HORMONE: CPT | Performed by: FAMILY MEDICINE

## 2021-12-07 PROCEDURE — 82607 VITAMIN B-12: CPT | Performed by: FAMILY MEDICINE

## 2021-12-07 PROCEDURE — 36415 COLL VENOUS BLD VENIPUNCTURE: CPT | Performed by: FAMILY MEDICINE

## 2021-12-07 PROCEDURE — 99215 OFFICE O/P EST HI 40 MIN: CPT | Performed by: FAMILY MEDICINE

## 2021-12-07 PROCEDURE — 3078F DIAST BP <80 MM HG: CPT | Performed by: FAMILY MEDICINE

## 2021-12-07 PROCEDURE — 82306 VITAMIN D 25 HYDROXY: CPT

## 2021-12-07 PROCEDURE — 85025 COMPLETE CBC W/AUTO DIFF WBC: CPT | Performed by: FAMILY MEDICINE

## 2021-12-07 PROCEDURE — 80053 COMPREHEN METABOLIC PANEL: CPT | Performed by: FAMILY MEDICINE

## 2021-12-07 PROCEDURE — 84466 ASSAY OF TRANSFERRIN: CPT | Performed by: FAMILY MEDICINE

## 2021-12-07 PROCEDURE — 3074F SYST BP LT 130 MM HG: CPT | Performed by: FAMILY MEDICINE

## 2021-12-07 PROCEDURE — 3008F BODY MASS INDEX DOCD: CPT | Performed by: FAMILY MEDICINE

## 2021-12-07 PROCEDURE — 83540 ASSAY OF IRON: CPT | Performed by: FAMILY MEDICINE

## 2021-12-07 PROCEDURE — 82728 ASSAY OF FERRITIN: CPT | Performed by: FAMILY MEDICINE

## 2021-12-07 PROCEDURE — 85652 RBC SED RATE AUTOMATED: CPT | Performed by: FAMILY MEDICINE

## 2021-12-07 PROCEDURE — 82746 ASSAY OF FOLIC ACID SERUM: CPT | Performed by: FAMILY MEDICINE

## 2021-12-07 NOTE — PROGRESS NOTES
HPI:   Patient presents with:  Lump: 2 lumps in neck-possibly lymph nodes, L gluteal  Weight Loss Gain  Pain  Ear Problem  Anemia      Ana Steinberg is a 46year old female.     She primarily presents for:  Multiple persistent complaints as well as:  Mas was negative in 2010.    • Demyelinating lesion ([facility] [address]    • Depression          Past Surgical History:   Procedure Laterality Date   • OTHER SURGICAL HISTORY Right dec 1998    Authroscopic, torn miniscus cartalidge       Lactose Intolerant      DIARRHEA, IDA no HSM, no tenderness, no guarding or rebound  NEURO: A&O x 3, CN II-XII intact B, motor and sensory grossly intact: B UE and LE, gait normal  MS: no significant joint deformity, FROM of UE/LE  PSYCH:  mood and affect depressed, labile, o/w WNL, speech and Future    13. Vitamin B12 deficiency  rechk now    15. Vegan diet  chk labs as below, f/u  P  - IRON AND TIBC  - VITAMIN D; Future  - VITAMIN U50  - FOLIC ACID SERUM(FOLATE)    15.  Otalgia of both ears  ?etiol, assoc'd w below-f/u w Neuro now (vs ENT if in

## 2021-12-11 DIAGNOSIS — E46 PROTEIN DEFICIENCY (HCC): ICD-10-CM

## 2021-12-11 DIAGNOSIS — R74.8 ELEVATED ALKALINE PHOSPHATASE LEVEL: Primary | ICD-10-CM

## 2021-12-11 DIAGNOSIS — D50.8 OTHER IRON DEFICIENCY ANEMIA: ICD-10-CM

## 2021-12-11 DIAGNOSIS — E61.1 IRON DEFICIENCY: ICD-10-CM

## 2021-12-11 NOTE — PROGRESS NOTES
Royce Palacios,    Attached are the results of your recently performed labs/tests:    Your Vitamin D is within NORMAL limits.     Take care,     Ronal Bianchi MD  12/11/2021    (Report(s) are attached, future lab/test orders or any referrals ar

## 2021-12-11 NOTE — PROGRESS NOTES
Royce Palacios,    Attached are the results of your recently performed labs/tests: All results are essentially within NORMAL limits. EXCEPT:    Your ferritin, a protein, is low. Your alkaline phosphatase, an enzyme, is slightly elevated.   Your se

## 2021-12-16 ENCOUNTER — HOSPITAL ENCOUNTER (OUTPATIENT)
Dept: ULTRASOUND IMAGING | Age: 41
Discharge: HOME | End: 2021-12-16
Attending: FAMILY MEDICINE
Payer: MEDICAID

## 2021-12-16 DIAGNOSIS — R22.1 LOCALIZED SWELLING, MASS AND LUMP, NECK: ICD-10-CM

## 2021-12-16 PROCEDURE — 76536 US EXAM OF HEAD AND NECK: CPT | Performed by: FAMILY MEDICINE

## 2021-12-17 NOTE — PROGRESS NOTES
Royce Palacios,    Attached are the results of your recently performed labs/tests:    Your ultrasound findings were benign: \"very likely inflammatory lymph nodes, not enlarged. \"    Please: Follow up:   With me in the next 4-6 weeks for a recheck and

## 2022-03-16 ENCOUNTER — PATIENT MESSAGE (OUTPATIENT)
Dept: FAMILY MEDICINE CLINIC | Facility: CLINIC | Age: 42
End: 2022-03-16

## 2022-03-16 ENCOUNTER — TELEPHONE (OUTPATIENT)
Dept: SURGERY | Facility: CLINIC | Age: 42
End: 2022-03-16

## 2022-06-10 ENCOUNTER — HOSPITAL ENCOUNTER (OUTPATIENT)
Dept: ULTRASOUND IMAGING | Age: 42
Discharge: HOME | End: 2022-06-10
Attending: FAMILY MEDICINE
Payer: MEDICAID

## 2022-06-10 ENCOUNTER — LAB ENCOUNTER (OUTPATIENT)
Dept: LAB | Age: 42
End: 2022-06-10
Attending: FAMILY MEDICINE
Payer: MEDICAID

## 2022-06-10 ENCOUNTER — HOSPITAL ENCOUNTER (OUTPATIENT)
Dept: ULTRASOUND IMAGING | Age: 42
Discharge: HOME | End: 2022-06-10
Attending: INTERNAL MEDICINE
Payer: MEDICAID

## 2022-06-10 DIAGNOSIS — R74.8 ACID PHOSPHATASE ELEVATED: Primary | ICD-10-CM

## 2022-06-10 DIAGNOSIS — D18.00 MULTIPLE HEMANGIOMAS: ICD-10-CM

## 2022-06-10 DIAGNOSIS — R22.1 LOCALIZED SWELLING, MASS AND LUMP, NECK: ICD-10-CM

## 2022-06-10 LAB
ALBUMIN SERPL-MCNC: 3.7 G/DL (ref 3.4–5)
ALBUMIN/GLOB SERPL: 1.1 {RATIO} (ref 1–2)
ALP LIVER SERPL-CCNC: 74 U/L
ALT SERPL-CCNC: 26 U/L
ANION GAP SERPL CALC-SCNC: 4 MMOL/L (ref 0–18)
AST SERPL-CCNC: 18 U/L (ref 15–37)
BASOPHILS # BLD AUTO: 0.03 X10(3) UL (ref 0–0.2)
BASOPHILS NFR BLD AUTO: 0.9 %
BILIRUB SERPL-MCNC: 1.3 MG/DL (ref 0.1–2)
BUN BLD-MCNC: 11 MG/DL (ref 7–18)
CALCIUM BLD-MCNC: 9.1 MG/DL (ref 8.5–10.1)
CHLORIDE SERPL-SCNC: 109 MMOL/L (ref 98–112)
CO2 SERPL-SCNC: 27 MMOL/L (ref 21–32)
CREAT BLD-MCNC: 0.69 MG/DL
DEPRECATED HBV CORE AB SER IA-ACNC: 16.7 NG/ML
EOSINOPHIL # BLD AUTO: 0.07 X10(3) UL (ref 0–0.7)
EOSINOPHIL NFR BLD AUTO: 2 %
ERYTHROCYTE [DISTWIDTH] IN BLOOD BY AUTOMATED COUNT: 13 %
FASTING STATUS PATIENT QL REPORTED: YES
GLOBULIN PLAS-MCNC: 3.5 G/DL (ref 2.8–4.4)
GLUCOSE BLD-MCNC: 79 MG/DL (ref 70–99)
HCT VFR BLD AUTO: 39.5 %
HGB BLD-MCNC: 12.7 G/DL
IMM GRANULOCYTES # BLD AUTO: 0.01 X10(3) UL (ref 0–1)
IMM GRANULOCYTES NFR BLD: 0.3 %
IRON SATN MFR SERPL: 14 %
IRON SERPL-MCNC: 63 UG/DL
LYMPHOCYTES # BLD AUTO: 1.16 X10(3) UL (ref 1–4)
LYMPHOCYTES NFR BLD AUTO: 33.9 %
MCH RBC QN AUTO: 31 PG (ref 26–34)
MCHC RBC AUTO-ENTMCNC: 32.2 G/DL (ref 31–37)
MCV RBC AUTO: 96.3 FL
MONOCYTES # BLD AUTO: 0.23 X10(3) UL (ref 0.1–1)
MONOCYTES NFR BLD AUTO: 6.7 %
NEUTROPHILS # BLD AUTO: 1.92 X10 (3) UL (ref 1.5–7.7)
NEUTROPHILS # BLD AUTO: 1.92 X10(3) UL (ref 1.5–7.7)
NEUTROPHILS NFR BLD AUTO: 56.2 %
OSMOLALITY SERPL CALC.SUM OF ELEC: 288 MOSM/KG (ref 275–295)
PLATELET # BLD AUTO: 177 10(3)UL (ref 150–450)
POTASSIUM SERPL-SCNC: 3.9 MMOL/L (ref 3.5–5.1)
PROT SERPL-MCNC: 7.2 G/DL (ref 6.4–8.2)
RBC # BLD AUTO: 4.1 X10(6)UL
SODIUM SERPL-SCNC: 140 MMOL/L (ref 136–145)
TIBC SERPL-MCNC: 444 UG/DL (ref 240–450)
TRANSFERRIN SERPL-MCNC: 298 MG/DL (ref 200–360)
WBC # BLD AUTO: 3.4 X10(3) UL (ref 4–11)

## 2022-06-10 PROCEDURE — 83540 ASSAY OF IRON: CPT | Performed by: FAMILY MEDICINE

## 2022-06-10 PROCEDURE — 85025 COMPLETE CBC W/AUTO DIFF WBC: CPT | Performed by: FAMILY MEDICINE

## 2022-06-10 PROCEDURE — 80053 COMPREHEN METABOLIC PANEL: CPT | Performed by: FAMILY MEDICINE

## 2022-06-10 PROCEDURE — 84080 ASSAY ALKALINE PHOSPHATASES: CPT

## 2022-06-10 PROCEDURE — 83550 IRON BINDING TEST: CPT | Performed by: FAMILY MEDICINE

## 2022-06-10 PROCEDURE — 76536 US EXAM OF HEAD AND NECK: CPT | Performed by: FAMILY MEDICINE

## 2022-06-10 PROCEDURE — 82728 ASSAY OF FERRITIN: CPT | Performed by: FAMILY MEDICINE

## 2022-06-10 PROCEDURE — 84075 ASSAY ALKALINE PHOSPHATASE: CPT

## 2022-06-10 PROCEDURE — 76705 ECHO EXAM OF ABDOMEN: CPT | Performed by: INTERNAL MEDICINE

## 2022-06-13 LAB
ALK-PHOSPHATASE BONE CALC: 40 U/L
ALK-PHOSPHATASE LIVER CALC: 35 U/L
ALK-PHOSPHATASE OTHER CALC: 0 U/L
ALKALINE PHOSPHATASE: 75 U/L

## 2022-06-14 NOTE — PROGRESS NOTES
Royce Palacios,    Attached are the results of your recently performed labs/tests: All results are essentially within NORMAL limits.     Take care,     Arleen Preciado MD  6/13/2022    (Report(s) are attached, future lab/test orders or any referrals are in your chart/MyChart or will be mailed to you)

## 2022-06-16 DIAGNOSIS — E61.1 IRON DEFICIENCY: Primary | ICD-10-CM

## 2022-06-16 DIAGNOSIS — D72.818 OTHER DECREASED WHITE BLOOD CELL (WBC) COUNT: ICD-10-CM

## 2022-06-17 ENCOUNTER — TELEPHONE (OUTPATIENT)
Dept: HEMATOLOGY/ONCOLOGY | Facility: HOSPITAL | Age: 42
End: 2022-06-17

## 2022-06-17 NOTE — TELEPHONE ENCOUNTER
Patient is calling to see if she can see Dr. Saskia Melgar in [facility] for Anemia, she stated that   Dr. Daija Huang wanted her to see to him. She stated that the  sent her a message regarding her labs. Please advise.

## 2022-06-20 NOTE — TELEPHONE ENCOUNTER
Called patient and I was unable to reach her and no VM was available to leave a message, I will try later today.  Called 6/20/22, thanks gr

## 2022-06-22 ENCOUNTER — OFFICE VISIT (OUTPATIENT)
Dept: HEMATOLOGY/ONCOLOGY | Facility: HOSPITAL | Age: 42
End: 2022-06-22
Attending: INTERNAL MEDICINE
Payer: MEDICAID

## 2022-06-22 VITALS
RESPIRATION RATE: 16 BRPM | SYSTOLIC BLOOD PRESSURE: 90 MMHG | DIASTOLIC BLOOD PRESSURE: 59 MMHG | WEIGHT: 96 LBS | OXYGEN SATURATION: 100 % | BODY MASS INDEX: 17.01 KG/M2 | HEIGHT: 63 IN | HEART RATE: 88 BPM

## 2022-06-22 DIAGNOSIS — D72.819 LEUKOPENIA, UNSPECIFIED TYPE: Primary | ICD-10-CM

## 2022-06-22 PROCEDURE — 99204 OFFICE O/P NEW MOD 45 MIN: CPT | Performed by: INTERNAL MEDICINE

## 2022-06-23 NOTE — CONSULTS
Rolo Mota    PATIENT'S NAME: Alex Gutierrez   CONSULTING PHYSICIAN: Giselle Lopez MD   PATIENT ACCOUNT #: [de-identified] LOCATION: [address] [place] [address] RECORD #: [id number] YOB: 1980   CONSULTATION DATE: 06/22/2022       [facility] NEW PATIENT CONSULT    CHIEF COMPLAINT:  Recommendations regarding management of mild leukopenia. HISTORY OF PRESENT ILLNESS:  The patient is a 51-year-old female. She has multiple physical issues, among which is some general fatigue. She sees Dr. Domingo Chavez for regular followup. She has had a mild degree of leukopenia off and on for a number of years. Her most recent proximate CBC was done on Tala 10 and her white count was 3.4. The other components of her CBC were completely normal with a normal hemoglobin of 12.7, platelets of 404, normal indices and normal differential.  Her most proximate CBC before this was in December, at which time her white count was 3.2. Again, on each of these occasions, she has had an absolute neutrophil count that has been well over 1500. She occasionally has had a borderline low lymphocyte count, but on most occasions her differential and absolute lymphocyte and neutrophil counts are normal.  She does have a history of some suggestion of autoimmune condition. She has modestly elevated sedimentation rates in the past; the last 2 were in the 27 to 32 range. She has some eczematous changes on her hands and she has had rheumatologic evaluation. She has a nonspecific mild elevation of her LACHELLE. She denies any real frequency of infections. She is very thin, but she denies ever having had an eating disorder. She states she eats a great deal, but she is a vegan. Her weight has varied. She is currently 96 pounds and the most recent proximal weight in December was down to 89 pounds, but it is at a reasonable level and close to the baseline for the last several years.   She has not had any recent infection such as cutaneous infection, sinus infections. She has avoided vaccination because she had an episode of a single demyelinating lesion neurologically. She was seen at that point, both at [facility] and at [facility]. She has also seen Dr. Bella Taveras for rheumatologic followup. She was told by the neurologist at [facility] not to take vaccinations. She comes today to discuss the etiology of her leukopenia. She has had B12 supplementation orally and she has had a B12 level. PAST MEDICAL HISTORY:  Remarkable for no history of hypertension, heart disease. She has a history of multiple hemangiomas in the liver which she has been followed by Dr. Param Bates. She had a reported history of copper deficiency in 2015, though it is not clear to me if she was really repleted. She had low copper levels and low ceruloplasmin levels according to her, but I do not have any records available in the current computer system. She has not had major anemia. She does take iron supplementation given the fact that she is a vegan. She is  0, para 0. She has not been able to work since 2016. She states that she had an \"allergic reaction\" to gadolinium which caused multiple issues including inability to walk, skin problems and GI complaints. This has gradually improved. She also states that she is gluten intolerant and she avoids gluten at present. PAST SURGICAL HISTORY:  Her surgical history includes only right arthroscopic knee surgery at the age of 14. MEDICATIONS:  Only vitamin D, 2000 units daily; vitamin B12, 250 mcg daily; and an iron supplement, which is reportedly 18 mg of elemental iron which she takes once a day. SOCIAL HISTORY:  She lives with her mother in [facility] [facility]. She does not smoke. She does not drink alcohol. She has intolerance to lactose and gluten. REVIEW OF SYSTEMS:  Remarkable for no distinct headaches. She has had pain around the back of her head, left greater than right, though this has been getting better. She states that her vision is good. Her hearing is fair, though she gets tinnitus. She has not had any recent dental care. She denies any asthma, cough, or shortness of breath. She denies any GERD. She had some left-sided rib burning for which she went to a GI doctor for a time. She has chronic issues with constipation. She denies any major urinary complaints. She had 1 brief episode of spotting with urination once and that resolved on its own. She has some mild arthritic symptoms in her knees and she has generalized bone pain. She denies any thyroid or endocrine disorder. She has difficulty with insomnia and the remainder of a 10-point complete review of systems is unremarkable. Pertinent positives and negatives are in the HPI. PHYSICAL EXAMINATION:    GENERAL:  She is a thin female in no acute distress. She is relatively frail appearing. VITAL SIGNS:  Her performance status is 1. Her weight is 96 pounds. She is 5 feet 3 inches. Blood pressure is 90/59, pulse 88, respiratory rate is 20. She is afebrile. HEENT:  Unremarkable. LYMPHATICS:  She has no adenopathy. LUNGS:  Clear. HEART:  Normal.  ABDOMEN:  No hepatosplenomegaly or tenderness. EXTREMITIES:  She has no clubbing, cyanosis, or edema. NEUROLOGIC:  She is intact. LABORATORY DATA:  As noted above. IMPRESSION:  Mild leukopenia. Going back to the medical record, I can see that she has had mild degree of leukopenia off and on at least through 2018. It has been consistent, usually in the mid threes to upper threes, with no significant absolute neutropenia or lymphopenia. Her hemoglobin and platelets are always adequate, except for on 1 occasion when she was slightly iron deficient and, as a result, she has now been taking oral iron. The iron deficiency is likely due to a comniation of menstrual blood loss and a vegan diet. The etiology of her leukopenia is unclear.   There may be an element of autoimmune mild leukopenia, though it is not severe and certainly does not warrant any intervention. There is also a possibility that some of this may be nutritional.  Having said that, it is also not at a level that is dangerous. She has adequate neutrophils. It may be also familial.  I do not have any very old CBCs to compare to, but at this point, it has not been progressive for at least 4 years and I told her I did not think she warranted any further evaluation for this. Specifically, I do not think that she needs to have a bone marrow and I would recommend that Dr. Marlin Shelton just check her blood counts twice yearly to make sure. I would definitely like her to continue to take her iron. She has had very irregular periods and her self imposed dietary restrictions ae worrisome. She states that she does not have an eating disorder, but there are certainly some features that are somewhat worrisome. I will, at this point, not make a routine followup appointment for us. I am available should there be more issues.     Dictated By Amelia Hernandes MD  d: 06/22/2022 17:29:54  t: 06/23/2022 01:55:52  Job [id number]  BLNACA/    cc: Bushra Valerio MD

## 2022-10-03 ENCOUNTER — OFFICE VISIT (OUTPATIENT)
Dept: FAMILY MEDICINE CLINIC | Facility: CLINIC | Age: 42
End: 2022-10-03
Payer: MEDICAID

## 2022-10-03 ENCOUNTER — LAB ENCOUNTER (OUTPATIENT)
Dept: LAB | Facility: REFERENCE LAB | Age: 42
End: 2022-10-03
Attending: NURSE PRACTITIONER
Payer: MEDICAID

## 2022-10-03 VITALS
DIASTOLIC BLOOD PRESSURE: 56 MMHG | BODY MASS INDEX: 16.95 KG/M2 | HEIGHT: 63 IN | WEIGHT: 95.63 LBS | SYSTOLIC BLOOD PRESSURE: 90 MMHG

## 2022-10-03 DIAGNOSIS — Z28.21 INFLUENZA VACCINATION DECLINED BY PATIENT: ICD-10-CM

## 2022-10-03 DIAGNOSIS — Z87.448 HISTORY OF HEMATURIA: ICD-10-CM

## 2022-10-03 DIAGNOSIS — E55.9 VITAMIN D DEFICIENCY: ICD-10-CM

## 2022-10-03 DIAGNOSIS — Z00.00 ADULT GENERAL MEDICAL EXAMINATION: Primary | ICD-10-CM

## 2022-10-03 DIAGNOSIS — Z12.31 SCREENING MAMMOGRAM, ENCOUNTER FOR: ICD-10-CM

## 2022-10-03 DIAGNOSIS — R82.90 ABNORMAL URINALYSIS: ICD-10-CM

## 2022-10-03 DIAGNOSIS — E61.1 IRON DEFICIENCY: ICD-10-CM

## 2022-10-03 DIAGNOSIS — Z12.4 ROUTINE CERVICAL SMEAR: ICD-10-CM

## 2022-10-03 LAB
ALBUMIN SERPL-MCNC: 3.9 G/DL (ref 3.4–5)
ALBUMIN/GLOB SERPL: 1.1 {RATIO} (ref 1–2)
ALP LIVER SERPL-CCNC: 86 U/L
ALT SERPL-CCNC: 30 U/L
ANION GAP SERPL CALC-SCNC: 5 MMOL/L (ref 0–18)
APPEARANCE: CLEAR
AST SERPL-CCNC: 18 U/L (ref 15–37)
BASOPHILS # BLD AUTO: 0.03 X10(3) UL (ref 0–0.2)
BASOPHILS NFR BLD AUTO: 1.2 %
BILIRUB SERPL-MCNC: 0.8 MG/DL (ref 0.1–2)
BILIRUBIN: NEGATIVE
BUN BLD-MCNC: 10 MG/DL (ref 7–18)
BUN/CREAT SERPL: 15.2 (ref 10–20)
CALCIUM BLD-MCNC: 9.2 MG/DL (ref 8.5–10.1)
CHLORIDE SERPL-SCNC: 109 MMOL/L (ref 98–112)
CHOLEST SERPL-MCNC: 184 MG/DL (ref ?–200)
CO2 SERPL-SCNC: 28 MMOL/L (ref 21–32)
CREAT BLD-MCNC: 0.66 MG/DL
DEPRECATED HBV CORE AB SER IA-ACNC: 14.4 NG/ML
DEPRECATED RDW RBC AUTO: 44.4 FL (ref 35.1–46.3)
EOSINOPHIL # BLD AUTO: 0.05 X10(3) UL (ref 0–0.7)
EOSINOPHIL NFR BLD AUTO: 2 %
ERYTHROCYTE [DISTWIDTH] IN BLOOD BY AUTOMATED COUNT: 12.5 % (ref 11–15)
EST. AVERAGE GLUCOSE BLD GHB EST-MCNC: 88 MG/DL (ref 68–126)
FASTING PATIENT LIPID ANSWER: YES
FASTING STATUS PATIENT QL REPORTED: YES
GFR SERPLBLD BASED ON 1.73 SQ M-ARVRAT: 114 ML/MIN/1.73M2 (ref 60–?)
GLOBULIN PLAS-MCNC: 3.5 G/DL (ref 2.8–4.4)
GLUCOSE (URINE DIPSTICK): NEGATIVE MG/DL
GLUCOSE BLD-MCNC: 85 MG/DL (ref 70–99)
HBA1C MFR BLD: 4.7 % (ref ?–5.7)
HCT VFR BLD AUTO: 39.6 %
HDLC SERPL-MCNC: 60 MG/DL (ref 40–59)
HGB BLD-MCNC: 12.6 G/DL
IMM GRANULOCYTES # BLD AUTO: 0.01 X10(3) UL (ref 0–1)
IMM GRANULOCYTES NFR BLD: 0.4 %
IRON SATN MFR SERPL: 13 %
IRON SERPL-MCNC: 64 UG/DL
KETONES (URINE DIPSTICK): NEGATIVE MG/DL
LDLC SERPL CALC-MCNC: 114 MG/DL (ref ?–100)
LEUKOCYTES: NEGATIVE
LYMPHOCYTES # BLD AUTO: 0.83 X10(3) UL (ref 1–4)
LYMPHOCYTES NFR BLD AUTO: 32.7 %
MCH RBC QN AUTO: 30.8 PG (ref 26–34)
MCHC RBC AUTO-ENTMCNC: 31.8 G/DL (ref 31–37)
MCV RBC AUTO: 96.8 FL
MONOCYTES # BLD AUTO: 0.18 X10(3) UL (ref 0.1–1)
MONOCYTES NFR BLD AUTO: 7.1 %
MULTISTIX LOT#: ABNORMAL NUMERIC
NEUTROPHILS # BLD AUTO: 1.44 X10 (3) UL (ref 1.5–7.7)
NEUTROPHILS # BLD AUTO: 1.44 X10(3) UL (ref 1.5–7.7)
NEUTROPHILS NFR BLD AUTO: 56.6 %
NITRITE, URINE: NEGATIVE
NONHDLC SERPL-MCNC: 124 MG/DL (ref ?–130)
OSMOLALITY SERPL CALC.SUM OF ELEC: 292 MOSM/KG (ref 275–295)
PH, URINE: 6 (ref 4.5–8)
PLATELET # BLD AUTO: 202 10(3)UL (ref 150–450)
POTASSIUM SERPL-SCNC: 4 MMOL/L (ref 3.5–5.1)
PROT SERPL-MCNC: 7.4 G/DL (ref 6.4–8.2)
PROTEIN (URINE DIPSTICK): NEGATIVE MG/DL
RBC # BLD AUTO: 4.09 X10(6)UL
SODIUM SERPL-SCNC: 142 MMOL/L (ref 136–145)
SPECIFIC GRAVITY: 1 (ref 1–1.03)
TIBC SERPL-MCNC: 483 UG/DL (ref 240–450)
TRANSFERRIN SERPL-MCNC: 324 MG/DL (ref 200–360)
TRIGL SERPL-MCNC: 53 MG/DL (ref 30–149)
TSI SER-ACNC: 0.88 MIU/ML (ref 0.36–3.74)
URINE-COLOR: YELLOW
UROBILINOGEN,SEMI-QN: 0.2 MG/DL (ref 0–1.9)
VIT B12 SERPL-MCNC: 679 PG/ML (ref 193–986)
VIT D+METAB SERPL-MCNC: 58 NG/ML (ref 30–100)
VLDLC SERPL CALC-MCNC: 9 MG/DL (ref 0–30)
WBC # BLD AUTO: 2.5 X10(3) UL (ref 4–11)

## 2022-10-03 PROCEDURE — 83540 ASSAY OF IRON: CPT | Performed by: FAMILY MEDICINE

## 2022-10-03 PROCEDURE — 85025 COMPLETE CBC W/AUTO DIFF WBC: CPT | Performed by: FAMILY MEDICINE

## 2022-10-03 PROCEDURE — 84466 ASSAY OF TRANSFERRIN: CPT | Performed by: FAMILY MEDICINE

## 2022-10-03 PROCEDURE — 36415 COLL VENOUS BLD VENIPUNCTURE: CPT | Performed by: NURSE PRACTITIONER

## 2022-10-03 PROCEDURE — 82607 VITAMIN B-12: CPT | Performed by: FAMILY MEDICINE

## 2022-10-03 PROCEDURE — 80053 COMPREHEN METABOLIC PANEL: CPT | Performed by: FAMILY MEDICINE

## 2022-10-03 PROCEDURE — 84443 ASSAY THYROID STIM HORMONE: CPT | Performed by: FAMILY MEDICINE

## 2022-10-03 PROCEDURE — 87624 HPV HI-RISK TYP POOLED RSLT: CPT | Performed by: NURSE PRACTITIONER

## 2022-10-03 PROCEDURE — 80061 LIPID PANEL: CPT | Performed by: FAMILY MEDICINE

## 2022-10-03 PROCEDURE — 82728 ASSAY OF FERRITIN: CPT | Performed by: FAMILY MEDICINE

## 2022-10-03 PROCEDURE — 82306 VITAMIN D 25 HYDROXY: CPT

## 2022-10-03 PROCEDURE — 83036 HEMOGLOBIN GLYCOSYLATED A1C: CPT | Performed by: NURSE PRACTITIONER

## 2022-10-03 PROCEDURE — 87086 URINE CULTURE/COLONY COUNT: CPT | Performed by: NURSE PRACTITIONER

## 2022-10-04 LAB — HPV I/H RISK 1 DNA SPEC QL NAA+PROBE: NEGATIVE

## 2022-10-07 ENCOUNTER — PATIENT MESSAGE (OUTPATIENT)
Dept: INTERNAL MEDICINE CLINIC | Facility: CLINIC | Age: 42
End: 2022-10-07

## 2022-10-15 LAB
LAST PAP RESULT: NORMAL
PAP HISTORY (OTHER THAN LAST PAP): NORMAL

## 2022-10-20 ENCOUNTER — HOSPITAL ENCOUNTER (OUTPATIENT)
Dept: ULTRASOUND IMAGING | Age: 42
Discharge: HOME | End: 2022-10-20
Attending: NURSE PRACTITIONER
Payer: MEDICAID

## 2022-10-20 DIAGNOSIS — R82.90 ABNORMAL URINALYSIS: Primary | ICD-10-CM

## 2022-10-20 DIAGNOSIS — R82.90 ABNORMAL URINALYSIS: ICD-10-CM

## 2022-10-20 DIAGNOSIS — Z87.448 HISTORY OF HEMATURIA: ICD-10-CM

## 2022-10-20 DIAGNOSIS — N83.202 CYST OF LEFT OVARY: ICD-10-CM

## 2022-10-20 PROCEDURE — 76770 US EXAM ABDO BACK WALL COMP: CPT | Performed by: NURSE PRACTITIONER

## 2022-10-26 ENCOUNTER — TELEMEDICINE (OUTPATIENT)
Dept: FAMILY MEDICINE CLINIC | Facility: CLINIC | Age: 42
End: 2022-10-26

## 2022-10-26 DIAGNOSIS — R53.1 WEAKNESS: ICD-10-CM

## 2022-10-26 DIAGNOSIS — D50.8 OTHER IRON DEFICIENCY ANEMIA: ICD-10-CM

## 2022-10-26 DIAGNOSIS — M25.50 ARTHRALGIA, UNSPECIFIED JOINT: ICD-10-CM

## 2022-10-26 DIAGNOSIS — R53.82 CHRONIC FATIGUE: ICD-10-CM

## 2022-10-26 DIAGNOSIS — G89.4 CHRONIC PAIN SYNDROME: Primary | ICD-10-CM

## 2022-10-26 DIAGNOSIS — M79.10 MYALGIA: ICD-10-CM

## 2022-10-26 DIAGNOSIS — D72.818 OTHER DECREASED WHITE BLOOD CELL (WBC) COUNT: ICD-10-CM

## 2022-10-26 DIAGNOSIS — R68.89 EXERCISE INTOLERANCE: ICD-10-CM

## 2022-10-26 DIAGNOSIS — G62.9 NEUROPATHY: ICD-10-CM

## 2022-10-26 DIAGNOSIS — N83.202 CYST OF LEFT OVARY: ICD-10-CM

## 2022-10-26 DIAGNOSIS — Z02.89 ENCOUNTER FOR COMPLETION OF FORM WITH PATIENT: ICD-10-CM

## 2022-10-26 PROCEDURE — 99215 OFFICE O/P EST HI 40 MIN: CPT | Performed by: FAMILY MEDICINE

## 2022-10-26 RX ORDER — DULOXETIN HYDROCHLORIDE 20 MG/1
CAPSULE, DELAYED RELEASE ORAL
Qty: 30 CAPSULE | Refills: 2 | Status: SHIPPED | OUTPATIENT
Start: 2022-10-26

## 2022-12-01 ENCOUNTER — HOSPITAL ENCOUNTER (OUTPATIENT)
Dept: ULTRASOUND IMAGING | Age: 42
Discharge: HOME | End: 2022-12-01
Attending: NURSE PRACTITIONER
Payer: MEDICAID

## 2022-12-01 ENCOUNTER — LAB ENCOUNTER (OUTPATIENT)
Dept: LAB | Age: 42
End: 2022-12-01
Attending: NURSE PRACTITIONER
Payer: MEDICAID

## 2022-12-01 DIAGNOSIS — N83.202 CYST OF LEFT OVARY: ICD-10-CM

## 2022-12-01 LAB
BASOPHILS # BLD AUTO: 0.03 X10(3) UL (ref 0–0.2)
BASOPHILS NFR BLD AUTO: 0.8 %
DEPRECATED RDW RBC AUTO: 44.5 FL (ref 35.1–46.3)
EOSINOPHIL # BLD AUTO: 0.06 X10(3) UL (ref 0–0.7)
EOSINOPHIL NFR BLD AUTO: 1.6 %
ERYTHROCYTE [DISTWIDTH] IN BLOOD BY AUTOMATED COUNT: 12.6 % (ref 11–15)
HCT VFR BLD AUTO: 40.1 %
HGB BLD-MCNC: 12.8 G/DL
IMM GRANULOCYTES # BLD AUTO: 0.01 X10(3) UL (ref 0–1)
IMM GRANULOCYTES NFR BLD: 0.3 %
LYMPHOCYTES # BLD AUTO: 1.21 X10(3) UL (ref 1–4)
LYMPHOCYTES NFR BLD AUTO: 32.4 %
MCH RBC QN AUTO: 30.8 PG (ref 26–34)
MCHC RBC AUTO-ENTMCNC: 31.9 G/DL (ref 31–37)
MCV RBC AUTO: 96.6 FL
MONOCYTES # BLD AUTO: 0.22 X10(3) UL (ref 0.1–1)
MONOCYTES NFR BLD AUTO: 5.9 %
NEUTROPHILS # BLD AUTO: 2.21 X10 (3) UL (ref 1.5–7.7)
NEUTROPHILS # BLD AUTO: 2.21 X10(3) UL (ref 1.5–7.7)
NEUTROPHILS NFR BLD AUTO: 59 %
PLATELET # BLD AUTO: 208 10(3)UL (ref 150–450)
RBC # BLD AUTO: 4.15 X10(6)UL
WBC # BLD AUTO: 3.7 X10(3) UL (ref 4–11)

## 2022-12-01 PROCEDURE — 85025 COMPLETE CBC W/AUTO DIFF WBC: CPT | Performed by: FAMILY MEDICINE

## 2022-12-01 PROCEDURE — 93975 VASCULAR STUDY: CPT | Performed by: FAMILY MEDICINE

## 2022-12-01 PROCEDURE — 76830 TRANSVAGINAL US NON-OB: CPT | Performed by: FAMILY MEDICINE

## 2022-12-01 PROCEDURE — 36415 COLL VENOUS BLD VENIPUNCTURE: CPT | Performed by: FAMILY MEDICINE

## 2022-12-01 PROCEDURE — 76856 US EXAM PELVIC COMPLETE: CPT | Performed by: FAMILY MEDICINE

## 2022-12-02 ENCOUNTER — TELEPHONE (OUTPATIENT)
Dept: INTEGRATIVE MEDICINE | Facility: CLINIC | Age: 42
End: 2022-12-02

## 2022-12-02 NOTE — TELEPHONE ENCOUNTER
Pt called and stated she is returning a call regarding US of pelvis. Pt stated a msg was left however she could not not make out name of MD or person leaving the msg. Pt stated she believes it was the new DrManuel In the office (Dr. Agata De León) since the person who left the msg was male.  Informed pt I did not see any documentation of call being made to her from this office but that I would send a TE.

## 2022-12-03 DIAGNOSIS — N94.89 ADNEXAL MASS: ICD-10-CM

## 2022-12-03 DIAGNOSIS — N83.202 CYST OF LEFT OVARY: ICD-10-CM

## 2022-12-03 DIAGNOSIS — N84.0 ENDOMETRIAL POLYP: ICD-10-CM

## 2022-12-03 DIAGNOSIS — R93.89 ABNORMAL ULTRASOUND OF PELVIS: Primary | ICD-10-CM

## 2022-12-09 ENCOUNTER — LAB ENCOUNTER (OUTPATIENT)
Dept: LAB | Facility: HOSPITAL | Age: 42
End: 2022-12-09
Attending: OBSTETRICS & GYNECOLOGY
Payer: MEDICAID

## 2022-12-09 ENCOUNTER — OFFICE VISIT (OUTPATIENT)
Dept: SURGERY | Facility: CLINIC | Age: 42
End: 2022-12-09
Payer: MEDICAID

## 2022-12-09 VITALS
SYSTOLIC BLOOD PRESSURE: 103 MMHG | DIASTOLIC BLOOD PRESSURE: 60 MMHG | HEIGHT: 63 IN | WEIGHT: 92 LBS | BODY MASS INDEX: 16.3 KG/M2

## 2022-12-09 DIAGNOSIS — N83.202 CYST OF LEFT OVARY: ICD-10-CM

## 2022-12-09 DIAGNOSIS — N94.89 ADNEXAL MASS: ICD-10-CM

## 2022-12-09 DIAGNOSIS — N94.89 ADNEXAL MASS: Primary | ICD-10-CM

## 2022-12-09 DIAGNOSIS — N84.0 ENDOMETRIAL POLYP: ICD-10-CM

## 2022-12-09 LAB — CANCER AG125 SERPL-ACNC: 10.4 U/ML (ref ?–35)

## 2022-12-09 PROCEDURE — 3008F BODY MASS INDEX DOCD: CPT | Performed by: OBSTETRICS & GYNECOLOGY

## 2022-12-09 PROCEDURE — 3074F SYST BP LT 130 MM HG: CPT | Performed by: OBSTETRICS & GYNECOLOGY

## 2022-12-09 PROCEDURE — 86304 IMMUNOASSAY TUMOR CA 125: CPT

## 2022-12-09 PROCEDURE — 36415 COLL VENOUS BLD VENIPUNCTURE: CPT

## 2022-12-09 PROCEDURE — 3078F DIAST BP <80 MM HG: CPT | Performed by: OBSTETRICS & GYNECOLOGY

## 2022-12-09 PROCEDURE — 99205 OFFICE O/P NEW HI 60 MIN: CPT | Performed by: OBSTETRICS & GYNECOLOGY

## 2022-12-12 ENCOUNTER — TELEPHONE (OUTPATIENT)
Dept: FAMILY MEDICINE CLINIC | Facility: CLINIC | Age: 42
End: 2022-12-12

## 2022-12-12 NOTE — TELEPHONE ENCOUNTER
Can you please find out what this means? Patient had a recent pelvic US, saw gyne afterward. I ordered an MRI pelvis due to abnormal findings on ultrasound. If gyne wants a repeat US, they should order it.

## 2022-12-13 NOTE — TELEPHONE ENCOUNTER
RN called and spoke to patient. Patient states that in the result note under her pelvic ultrasound  (12/1/22 )  Agusto Mabry stated that the patient is to schedule a follow up pelvic ultrasound in 6 weeks. Patient would like Agusto Mabry to place order for follow up pelvic ultrasound in 6 weeks. Patient said that she has not scheduled the MRI because she is uncomfortable with having gadolinium contrast in the MRI.

## 2022-12-15 NOTE — TELEPHONE ENCOUNTER
Pt notified of HOWIE Ramsey recommendations below. RN instructed pt to have surgical consult. Pt did have labs done. Pt verbalized understanding.

## 2024-03-19 PROBLEM — R19.8 CHANGE IN BOWEL MOVEMENT: Status: ACTIVE | Noted: 2023-12-28

## 2024-03-19 PROBLEM — R14.0 BLOATED ABDOMEN: Status: ACTIVE | Noted: 2019-03-01

## 2024-03-19 PROBLEM — E44.0 MODERATE PROTEIN-CALORIE MALNUTRITION (HCC): Status: ACTIVE | Noted: 2019-05-09

## 2024-03-21 ENCOUNTER — LAB ENCOUNTER (OUTPATIENT)
Dept: LAB | Age: 44
End: 2024-03-21
Attending: STUDENT IN AN ORGANIZED HEALTH CARE EDUCATION/TRAINING PROGRAM
Payer: MEDICAID

## 2024-03-21 ENCOUNTER — OFFICE VISIT (OUTPATIENT)
Dept: FAMILY MEDICINE CLINIC | Facility: CLINIC | Age: 44
End: 2024-03-21
Payer: MEDICAID

## 2024-03-21 ENCOUNTER — TELEPHONE (OUTPATIENT)
Dept: FAMILY MEDICINE CLINIC | Facility: CLINIC | Age: 44
End: 2024-03-21

## 2024-03-21 VITALS
DIASTOLIC BLOOD PRESSURE: 60 MMHG | OXYGEN SATURATION: 94 % | WEIGHT: 98.19 LBS | HEIGHT: 63 IN | BODY MASS INDEX: 17.4 KG/M2 | TEMPERATURE: 97 F | HEART RATE: 82 BPM | SYSTOLIC BLOOD PRESSURE: 98 MMHG

## 2024-03-21 DIAGNOSIS — Z12.31 BREAST CANCER SCREENING BY MAMMOGRAM: ICD-10-CM

## 2024-03-21 DIAGNOSIS — N83.202 CYST OF OVARY, LEFT: Primary | ICD-10-CM

## 2024-03-21 DIAGNOSIS — E55.9 VITAMIN D DEFICIENCY: ICD-10-CM

## 2024-03-21 DIAGNOSIS — R63.6 UNDERWEIGHT: ICD-10-CM

## 2024-03-21 DIAGNOSIS — Z01.818 PREOPERATIVE EXAMINATION, UNSPECIFIED: Primary | ICD-10-CM

## 2024-03-21 DIAGNOSIS — Z01.818 PREOP EXAMINATION: ICD-10-CM

## 2024-03-21 LAB
ALBUMIN SERPL-MCNC: 4.5 G/DL (ref 3.2–4.8)
ALBUMIN/GLOB SERPL: 1.7 {RATIO} (ref 1–2)
ALP LIVER SERPL-CCNC: 66 U/L
ALT SERPL-CCNC: 14 U/L
ANION GAP SERPL CALC-SCNC: 7 MMOL/L (ref 0–18)
AST SERPL-CCNC: 23 U/L (ref ?–34)
ATRIAL RATE: 70 BPM
BILIRUB SERPL-MCNC: 1 MG/DL (ref 0.3–1.2)
BUN BLD-MCNC: 8 MG/DL (ref 9–23)
BUN/CREAT SERPL: 11.9 (ref 10–20)
CALCIUM BLD-MCNC: 9.8 MG/DL (ref 8.7–10.4)
CHLORIDE SERPL-SCNC: 107 MMOL/L (ref 98–112)
CO2 SERPL-SCNC: 27 MMOL/L (ref 21–32)
CREAT BLD-MCNC: 0.67 MG/DL
DEPRECATED RDW RBC AUTO: 42.5 FL (ref 35.1–46.3)
EGFRCR SERPLBLD CKD-EPI 2021: 113 ML/MIN/1.73M2 (ref 60–?)
ERYTHROCYTE [DISTWIDTH] IN BLOOD BY AUTOMATED COUNT: 12.4 % (ref 11–15)
EST. AVERAGE GLUCOSE BLD GHB EST-MCNC: 88 MG/DL (ref 68–126)
FASTING STATUS PATIENT QL REPORTED: YES
GLOBULIN PLAS-MCNC: 2.7 G/DL (ref 2.8–4.4)
GLUCOSE BLD-MCNC: 84 MG/DL (ref 70–99)
HBA1C MFR BLD: 4.7 % (ref ?–5.7)
HCT VFR BLD AUTO: 37.3 %
HGB BLD-MCNC: 12.5 G/DL
MCH RBC QN AUTO: 31.1 PG (ref 26–34)
MCHC RBC AUTO-ENTMCNC: 33.5 G/DL (ref 31–37)
MCV RBC AUTO: 92.8 FL
OSMOLALITY SERPL CALC.SUM OF ELEC: 290 MOSM/KG (ref 275–295)
P AXIS: 68 DEGREES
P-R INTERVAL: 134 MS
PLATELET # BLD AUTO: 204 10(3)UL (ref 150–450)
POTASSIUM SERPL-SCNC: 3.5 MMOL/L (ref 3.5–5.1)
PROT SERPL-MCNC: 7.2 G/DL (ref 5.7–8.2)
Q-T INTERVAL: 386 MS
QRS DURATION: 84 MS
QTC CALCULATION (BEZET): 416 MS
R AXIS: 49 DEGREES
RBC # BLD AUTO: 4.02 X10(6)UL
SODIUM SERPL-SCNC: 141 MMOL/L (ref 136–145)
T AXIS: 67 DEGREES
VENTRICULAR RATE: 70 BPM
VIT D+METAB SERPL-MCNC: 51 NG/ML (ref 30–100)
WBC # BLD AUTO: 3.8 X10(3) UL (ref 4–11)

## 2024-03-21 PROCEDURE — 85025 COMPLETE CBC W/AUTO DIFF WBC: CPT | Performed by: STUDENT IN AN ORGANIZED HEALTH CARE EDUCATION/TRAINING PROGRAM

## 2024-03-21 PROCEDURE — 82306 VITAMIN D 25 HYDROXY: CPT

## 2024-03-21 PROCEDURE — 83036 HEMOGLOBIN GLYCOSYLATED A1C: CPT | Performed by: STUDENT IN AN ORGANIZED HEALTH CARE EDUCATION/TRAINING PROGRAM

## 2024-03-21 PROCEDURE — 93000 ELECTROCARDIOGRAM COMPLETE: CPT | Performed by: STUDENT IN AN ORGANIZED HEALTH CARE EDUCATION/TRAINING PROGRAM

## 2024-03-21 PROCEDURE — 99214 OFFICE O/P EST MOD 30 MIN: CPT | Performed by: STUDENT IN AN ORGANIZED HEALTH CARE EDUCATION/TRAINING PROGRAM

## 2024-03-21 PROCEDURE — 36415 COLL VENOUS BLD VENIPUNCTURE: CPT | Performed by: STUDENT IN AN ORGANIZED HEALTH CARE EDUCATION/TRAINING PROGRAM

## 2024-03-21 PROCEDURE — 80053 COMPREHEN METABOLIC PANEL: CPT | Performed by: STUDENT IN AN ORGANIZED HEALTH CARE EDUCATION/TRAINING PROGRAM

## 2024-03-21 PROCEDURE — 85027 COMPLETE CBC AUTOMATED: CPT | Performed by: STUDENT IN AN ORGANIZED HEALTH CARE EDUCATION/TRAINING PROGRAM

## 2024-03-21 NOTE — PROGRESS NOTES
Subjective:   Ewa Palacios is a 43 year old female who presents for Pre-Op Exam (Patient stated she is here today for a pre-op for a large ovarian cyst & growth. )     43-year-old female coming in for preop examination.  Patient is scheduled for laparoscopic left ovarian cyst removal on 4/5/2024 with Dr. Lee at [facility].  Needs a CBC and CMP.  History of being under general anesthesia as a teenager with postop nausea.  History of low WBC count.  States previously followed up with hematology and was advised to monitor.    History of demyelinating lesion in C-spine.  Followed up with Dr. Saezn at [facility].  Was advised to monitor and vitamin D supplementation.  Currently taking 4000 IU daily.    History of being underweight since 2015 and states has been struggling to put weight on.  In 2014 had a family matter that caused emotional stress. It is around that time when weight loss occurred and was never able to put weight back on.    Denies family history of colon cancer.  Denies history of MI, CHF, stroke/TIA, insulin use, kidney disease, DVT.    History/Other:    Chief Complaint Reviewed and Verified  Nursing Notes Reviewed and   Verified  Tobacco Reviewed  Allergies Reviewed  Medications Reviewed    Medical History Reviewed  Surgical History Reviewed  OB Status Reviewed    Family History Reviewed  Social History Reviewed         Tobacco:  She has never smoked tobacco.    Current Outpatient Medications   Medication Sig Dispense Refill    Vitamin B-12 250 MCG Oral Tab Take 1 tablet (250 mcg total) by mouth daily.      Cholecalciferol (VITAMIN D) 2000 UNITS Oral Tab Take 2,000 Units by mouth 2 (two) times daily.             Review of Systems:  Review of Systems   Constitutional:  Negative for chills, diaphoresis and fever.   HENT:  Negative for congestion, ear discharge, ear pain, sinus pressure, sinus pain and sore throat.    Eyes:  Negative for pain and discharge.   Respiratory:   Negative for cough, chest tightness, shortness of breath and wheezing.    Cardiovascular:  Negative for chest pain and palpitations.   Gastrointestinal:  Negative for abdominal pain, diarrhea, nausea and vomiting.   Endocrine: Negative for cold intolerance and heat intolerance.   Genitourinary:  Negative for dysuria, flank pain, frequency and urgency.   Musculoskeletal:  Negative for joint swelling.   Skin:  Negative for rash.   Neurological:  Negative for dizziness, syncope and headaches.   Psychiatric/Behavioral:  Negative for confusion and hallucinations.         Anxious.       Objective:   BP 98/60 (BP Location: Left arm, Patient Position: Sitting, Cuff Size: adult)   Pulse 82   Temp 97.3 °F (36.3 °C) (Temporal)   Ht 5' 3\" (1.6 m)   Wt 98 lb 3.2 oz (44.5 kg)   LMP 02/24/2024 (Exact Date)   SpO2 94%   BMI 17.40 kg/m²  Estimated body mass index is 17.4 kg/m² as calculated from the following:    Height as of this encounter: 5' 3\" (1.6 m).    Weight as of this encounter: 98 lb 3.2 oz (44.5 kg).  Physical Exam  Constitutional:       General: She is not in acute distress.     Appearance: Normal appearance. She is not ill-appearing or toxic-appearing.   HENT:      Head: Normocephalic and atraumatic.      Right Ear: Tympanic membrane and ear canal normal.      Left Ear: Tympanic membrane and ear canal normal.      Mouth/Throat:      Mouth: Mucous membranes are moist.      Pharynx: Oropharynx is clear. No oropharyngeal exudate or posterior oropharyngeal erythema.   Eyes:      Extraocular Movements: Extraocular movements intact.      Pupils: Pupils are equal, round, and reactive to light.   Cardiovascular:      Rate and Rhythm: Normal rate and regular rhythm.      Heart sounds: Normal heart sounds. No murmur heard.     No gallop.   Pulmonary:      Effort: Pulmonary effort is normal. No respiratory distress.      Breath sounds: Normal breath sounds. No stridor. No wheezing, rhonchi or rales.   Abdominal:       General: Bowel sounds are normal.      Palpations: Abdomen is soft.      Tenderness: There is no abdominal tenderness. There is no right CVA tenderness, left CVA tenderness or guarding.   Musculoskeletal:         General: No swelling.      Cervical back: Normal range of motion and neck supple. No rigidity or tenderness.   Skin:     General: Skin is warm and dry.   Neurological:      General: No focal deficit present.      Mental Status: She is alert and oriented to person, place, and time. Mental status is at baseline.      Cranial Nerves: No cranial nerve deficit.      Sensory: No sensory deficit.      Motor: Motor function is intact. No weakness.      Gait: Gait normal.   Psychiatric:         Mood and Affect: Mood normal.         Behavior: Behavior normal.         Thought Content: Thought content normal. Thought content does not include homicidal or suicidal ideation. Thought content does not include homicidal or suicidal plan.         Judgment: Judgment normal.      Comments: Anxious         Assessment & Plan:     1. Cyst of ovary, left (Primary)  Laparoscopic left ovarian cyst removal on 4/5/2024 with Dr. Lee at [facility].  2. Preop examination  EKG in office NSR at 70 bpm.  Revised Cardiac Risk Index for Pre-Operative Risk: Class II Risk.  Patient is medically optimized and is an acceptable candidate for the above-mentioned surgery pending lab results.  -     CBC, Platelet; No Differential  -     Comp Metabolic Panel (14)  -     Hemoglobin A1C  -     ELECTROCARDIOGRAM, COMPLETE  3. Vitamin D deficiency  On supplementation.  Would like to evaluate current labs.  -     Vitamin D; Future; Expected date: 03/21/2024  4. Underweight  History of being underweight since 2015 and states has been struggling to put weight on.  In 2014 had a family matter that caused emotional stress. It is around that time when weight loss occurred and was never able to put weight back on.  -Discussed consideration of  Remeron  5. Breast cancer screening by mammogram  -     KYRA CHRISS 2D+3D SCREENING BILAT (CPT=77067/60323); Future; Expected date: 03/21/2024          Return in about 2 months (around 5/21/2024).    Lauro Zapata MD, 3/21/2024, 10:15 AM         Time spent: 35 minutes, obtaining history, reviewing chart, evaluating patient, discussing differential diagnosis, lifestyle recommendations, diagnostic testing options, treatment options, risks and benefits of my recommendations, counseling patient, discussing prognosis/expectations, and follow up with patient as well as completing documentation.

## 2024-03-21 NOTE — TELEPHONE ENCOUNTER
Patient was at lab today and her surgeon is asking for CBC with differential. The lab ordered was without. Patient wants to know if this will make a difference with what her surgeon wanted and also does she have to go back to the lab to draw more blood or is the lab able to use the blood they already fito.

## 2024-03-22 ENCOUNTER — TELEPHONE (OUTPATIENT)
Dept: FAMILY MEDICINE CLINIC | Facility: CLINIC | Age: 44
End: 2024-03-22

## 2024-03-22 DIAGNOSIS — Z01.818 PREOP EXAMINATION: Primary | ICD-10-CM

## 2024-03-22 DIAGNOSIS — Z01.818 PREOPERATIVE EXAMINATION, UNSPECIFIED: ICD-10-CM

## 2024-03-22 LAB
BASOPHILS # BLD AUTO: 0.02 X10(3) UL (ref 0–0.2)
BASOPHILS NFR BLD AUTO: 0.6 %
DEPRECATED RDW RBC AUTO: 43.8 FL (ref 35.1–46.3)
EOSINOPHIL # BLD AUTO: 0.02 X10(3) UL (ref 0–0.7)
EOSINOPHIL NFR BLD AUTO: 0.6 %
ERYTHROCYTE [DISTWIDTH] IN BLOOD BY AUTOMATED COUNT: 12.6 % (ref 11–15)
HCT VFR BLD AUTO: 39.4 %
HGB BLD-MCNC: 12.4 G/DL
IMM GRANULOCYTES # BLD AUTO: 0.02 X10(3) UL (ref 0–1)
IMM GRANULOCYTES NFR BLD: 0.6 %
LYMPHOCYTES # BLD AUTO: 0.95 X10(3) UL (ref 1–4)
LYMPHOCYTES NFR BLD AUTO: 26.9 %
MCH RBC QN AUTO: 30.3 PG (ref 26–34)
MCHC RBC AUTO-ENTMCNC: 31.5 G/DL (ref 31–37)
MCV RBC AUTO: 96.3 FL
MONOCYTES # BLD AUTO: 0.19 X10(3) UL (ref 0.1–1)
MONOCYTES NFR BLD AUTO: 5.4 %
NEUTROPHILS # BLD AUTO: 2.33 X10 (3) UL (ref 1.5–7.7)
NEUTROPHILS # BLD AUTO: 2.33 X10(3) UL (ref 1.5–7.7)
NEUTROPHILS NFR BLD AUTO: 65.9 %
PLATELET # BLD AUTO: 209 10(3)UL (ref 150–450)
RBC # BLD AUTO: 4.09 X10(6)UL
WBC # BLD AUTO: 3.5 X10(3) UL (ref 4–11)

## 2024-03-28 ENCOUNTER — TELEPHONE (OUTPATIENT)
Dept: FAMILY MEDICINE CLINIC | Facility: CLINIC | Age: 44
End: 2024-03-28

## 2025-05-09 ENCOUNTER — OFFICE VISIT (OUTPATIENT)
Dept: FAMILY MEDICINE CLINIC | Facility: CLINIC | Age: 45
End: 2025-05-09
Payer: MEDICAID

## 2025-05-09 ENCOUNTER — LAB ENCOUNTER (OUTPATIENT)
Dept: LAB | Facility: REFERENCE LAB | Age: 45
End: 2025-05-09
Attending: STUDENT IN AN ORGANIZED HEALTH CARE EDUCATION/TRAINING PROGRAM
Payer: MEDICAID

## 2025-05-09 VITALS
WEIGHT: 98.81 LBS | OXYGEN SATURATION: 99 % | HEART RATE: 80 BPM | DIASTOLIC BLOOD PRESSURE: 62 MMHG | HEIGHT: 63 IN | BODY MASS INDEX: 17.51 KG/M2 | SYSTOLIC BLOOD PRESSURE: 98 MMHG

## 2025-05-09 DIAGNOSIS — G62.9 NEUROPATHY: ICD-10-CM

## 2025-05-09 DIAGNOSIS — E61.1 IRON DEFICIENCY: ICD-10-CM

## 2025-05-09 DIAGNOSIS — R63.6 UNDERWEIGHT: ICD-10-CM

## 2025-05-09 DIAGNOSIS — E55.9 VITAMIN D DEFICIENCY: ICD-10-CM

## 2025-05-09 DIAGNOSIS — Z12.4 SCREENING FOR CERVICAL CANCER: ICD-10-CM

## 2025-05-09 DIAGNOSIS — Z00.00 ROUTINE MEDICAL EXAM: Primary | ICD-10-CM

## 2025-05-09 DIAGNOSIS — N83.202 CYST OF OVARY, LEFT: ICD-10-CM

## 2025-05-09 DIAGNOSIS — Z00.00 ROUTINE MEDICAL EXAM: ICD-10-CM

## 2025-05-09 DIAGNOSIS — Z12.39 BREAST CANCER SCREENING OTHER THAN MAMMOGRAM: ICD-10-CM

## 2025-05-09 LAB
ALBUMIN SERPL-MCNC: 4.4 G/DL (ref 3.2–4.8)
ALBUMIN/GLOB SERPL: 1.8 {RATIO} (ref 1–2)
ALP LIVER SERPL-CCNC: 78 U/L (ref 37–98)
ALT SERPL-CCNC: 27 U/L (ref 10–49)
ANION GAP SERPL CALC-SCNC: 10 MMOL/L (ref 0–18)
AST SERPL-CCNC: 24 U/L (ref ?–34)
BASOPHILS # BLD AUTO: 0.03 X10(3) UL (ref 0–0.2)
BASOPHILS NFR BLD AUTO: 0.7 %
BILIRUB SERPL-MCNC: 0.9 MG/DL (ref 0.3–1.2)
BUN BLD-MCNC: 9 MG/DL (ref 9–23)
BUN/CREAT SERPL: 14.1 (ref 10–20)
CALCIUM BLD-MCNC: 9 MG/DL (ref 8.7–10.4)
CHLORIDE SERPL-SCNC: 105 MMOL/L (ref 98–112)
CHOLEST SERPL-MCNC: 184 MG/DL (ref ?–200)
CO2 SERPL-SCNC: 25 MMOL/L (ref 21–32)
CREAT BLD-MCNC: 0.64 MG/DL (ref 0.55–1.02)
DEPRECATED HBV CORE AB SER IA-ACNC: 5 NG/ML (ref 50–306)
DEPRECATED RDW RBC AUTO: 42.9 FL (ref 35.1–46.3)
EGFRCR SERPLBLD CKD-EPI 2021: 112 ML/MIN/1.73M2 (ref 60–?)
EOSINOPHIL # BLD AUTO: 0.04 X10(3) UL (ref 0–0.7)
EOSINOPHIL NFR BLD AUTO: 1 %
ERYTHROCYTE [DISTWIDTH] IN BLOOD BY AUTOMATED COUNT: 12.6 % (ref 11–15)
EST. AVERAGE GLUCOSE BLD GHB EST-MCNC: 97 MG/DL (ref 68–126)
FASTING PATIENT LIPID ANSWER: YES
FASTING STATUS PATIENT QL REPORTED: YES
GLOBULIN PLAS-MCNC: 2.5 G/DL (ref 2–3.5)
GLUCOSE BLD-MCNC: 81 MG/DL (ref 70–99)
HBA1C MFR BLD: 5 % (ref ?–5.7)
HCT VFR BLD AUTO: 34.2 % (ref 35–48)
HDLC SERPL-MCNC: 62 MG/DL (ref 40–59)
HGB BLD-MCNC: 10.8 G/DL (ref 12–16)
IMM GRANULOCYTES # BLD AUTO: 0 X10(3) UL (ref 0–1)
IMM GRANULOCYTES NFR BLD: 0 %
IRON SATN MFR SERPL: 10 % (ref 15–50)
IRON SERPL-MCNC: 41 UG/DL (ref 50–170)
LDLC SERPL CALC-MCNC: 112 MG/DL (ref ?–100)
LYMPHOCYTES # BLD AUTO: 1.24 X10(3) UL (ref 1–4)
LYMPHOCYTES NFR BLD AUTO: 29.7 %
MCH RBC QN AUTO: 29.3 PG (ref 26–34)
MCHC RBC AUTO-ENTMCNC: 31.6 G/DL (ref 31–37)
MCV RBC AUTO: 92.7 FL (ref 80–100)
MONOCYTES # BLD AUTO: 0.32 X10(3) UL (ref 0.1–1)
MONOCYTES NFR BLD AUTO: 7.7 %
NEUTROPHILS # BLD AUTO: 2.54 X10 (3) UL (ref 1.5–7.7)
NEUTROPHILS # BLD AUTO: 2.54 X10(3) UL (ref 1.5–7.7)
NEUTROPHILS NFR BLD AUTO: 60.9 %
NONHDLC SERPL-MCNC: 122 MG/DL (ref ?–130)
OSMOLALITY SERPL CALC.SUM OF ELEC: 288 MOSM/KG (ref 275–295)
PLATELET # BLD AUTO: 226 10(3)UL (ref 150–450)
POTASSIUM SERPL-SCNC: 3.8 MMOL/L (ref 3.5–5.1)
PROT SERPL-MCNC: 6.9 G/DL (ref 5.7–8.2)
RBC # BLD AUTO: 3.69 X10(6)UL (ref 3.8–5.3)
SODIUM SERPL-SCNC: 140 MMOL/L (ref 136–145)
T3FREE SERPL-MCNC: 2.9 PG/ML (ref 2.4–4.2)
T4 FREE SERPL-MCNC: 1.1 NG/DL (ref 0.8–1.7)
TOTAL IRON BINDING CAPACITY: 409 UG/DL (ref 250–425)
TRANSFERRIN SERPL-MCNC: 332 MG/DL (ref 250–380)
TRIGL SERPL-MCNC: 49 MG/DL (ref 30–149)
TSI SER-ACNC: 0.51 UIU/ML (ref 0.55–4.78)
VIT B12 SERPL-MCNC: 798 PG/ML (ref 211–911)
VIT D+METAB SERPL-MCNC: 59.6 NG/ML (ref 30–100)
VLDLC SERPL CALC-MCNC: 8 MG/DL (ref 0–30)
WBC # BLD AUTO: 4.2 X10(3) UL (ref 4–11)

## 2025-05-09 PROCEDURE — 80053 COMPREHEN METABOLIC PANEL: CPT

## 2025-05-09 PROCEDURE — 82607 VITAMIN B-12: CPT

## 2025-05-09 PROCEDURE — 84439 ASSAY OF FREE THYROXINE: CPT

## 2025-05-09 PROCEDURE — 84466 ASSAY OF TRANSFERRIN: CPT

## 2025-05-09 PROCEDURE — 36415 COLL VENOUS BLD VENIPUNCTURE: CPT

## 2025-05-09 PROCEDURE — 85025 COMPLETE CBC W/AUTO DIFF WBC: CPT

## 2025-05-09 PROCEDURE — 80061 LIPID PANEL: CPT

## 2025-05-09 PROCEDURE — 84481 FREE ASSAY (FT-3): CPT

## 2025-05-09 PROCEDURE — 83540 ASSAY OF IRON: CPT

## 2025-05-09 PROCEDURE — 82728 ASSAY OF FERRITIN: CPT

## 2025-05-09 PROCEDURE — 99396 PREV VISIT EST AGE 40-64: CPT | Performed by: STUDENT IN AN ORGANIZED HEALTH CARE EDUCATION/TRAINING PROGRAM

## 2025-05-09 PROCEDURE — 82306 VITAMIN D 25 HYDROXY: CPT

## 2025-05-09 PROCEDURE — 84443 ASSAY THYROID STIM HORMONE: CPT

## 2025-05-09 PROCEDURE — 83036 HEMOGLOBIN GLYCOSYLATED A1C: CPT
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
## 2025-05-12 ENCOUNTER — TELEPHONE (OUTPATIENT)
Dept: FAMILY MEDICINE CLINIC | Facility: CLINIC | Age: 45
End: 2025-05-12

## 2025-05-12 NOTE — TELEPHONE ENCOUNTER
Jazmin Nagel LPN MK    5/12/25  4:10 PM  Note      Questions about labs aware still might be pending

## 2025-05-12 NOTE — TELEPHONE ENCOUNTER
Patient contacted and advised that Dr. Zapata will review results and make recommendations.  She inquires if she should postpone gynecologic procedure until anemia is addressed.  Patient states she takes iron supplement intermittently. Please advise.

## 2025-05-15 NOTE — TELEPHONE ENCOUNTER
Written by Lauro Zapata MD on 5/15/2025  9:05 AM CDT  Seen by patient Ewa Palacios on 5/15/2025  9:30 AM

## 2025-05-27 ENCOUNTER — TELEPHONE (OUTPATIENT)
Dept: FAMILY MEDICINE CLINIC | Facility: CLINIC | Age: 45
End: 2025-05-27

## 2025-06-02 ENCOUNTER — LAB ENCOUNTER (OUTPATIENT)
Dept: LAB | Facility: REFERENCE LAB | Age: 45
End: 2025-06-02
Attending: STUDENT IN AN ORGANIZED HEALTH CARE EDUCATION/TRAINING PROGRAM
Payer: MEDICAID

## 2025-06-02 DIAGNOSIS — R79.89 LOW TSH LEVEL: ICD-10-CM

## 2025-06-02 DIAGNOSIS — D50.9 IRON DEFICIENCY ANEMIA, UNSPECIFIED IRON DEFICIENCY ANEMIA TYPE: ICD-10-CM

## 2025-06-02 LAB — HEMOCCULT STL QL: NEGATIVE

## 2025-06-02 PROCEDURE — 82274 ASSAY TEST FOR BLOOD FECAL: CPT

## (undated) DIAGNOSIS — R22.1 LOCALIZED SWELLING, MASS AND LUMP, NECK: Primary | ICD-10-CM

## (undated) DIAGNOSIS — D18.00 MULTIPLE HEMANGIOMAS: Primary | ICD-10-CM

## (undated) DIAGNOSIS — R59.1 LYMPHADENOPATHY: ICD-10-CM

## (undated) NOTE — LETTER
11/25/20    [place]      Dear Rochelle German,    [id number] [facility] records indicate that you have outstanding lab work and or testing that was ordered for you and has not yet been completed: Please call Lab Dept.  To schedule ap

## (undated) NOTE — LETTER
12/7/2021              Ewa Palacios 4/12/1980        [id number] [facility] [id number]         To Whom It May Concern,    I am writing to you at my patient's [facility], above) request. I saw her today for a subsequent follow up for